# Patient Record
Sex: FEMALE | Race: OTHER | NOT HISPANIC OR LATINO | URBAN - METROPOLITAN AREA
[De-identification: names, ages, dates, MRNs, and addresses within clinical notes are randomized per-mention and may not be internally consistent; named-entity substitution may affect disease eponyms.]

---

## 2023-09-26 ENCOUNTER — INPATIENT (INPATIENT)
Facility: HOSPITAL | Age: 68
LOS: 8 days | Discharge: HOME HEALTH | DRG: 314 | End: 2023-10-05
Attending: HOSPITALIST | Admitting: STUDENT IN AN ORGANIZED HEALTH CARE EDUCATION/TRAINING PROGRAM
Payer: MEDICARE

## 2023-09-26 VITALS
HEIGHT: 67 IN | WEIGHT: 179.9 LBS | SYSTOLIC BLOOD PRESSURE: 156 MMHG | HEART RATE: 56 BPM | DIASTOLIC BLOOD PRESSURE: 71 MMHG | TEMPERATURE: 97 F | RESPIRATION RATE: 22 BRPM | OXYGEN SATURATION: 97 %

## 2023-09-26 LAB
ALBUMIN SERPL ELPH-MCNC: 3.8 G/DL — SIGNIFICANT CHANGE UP (ref 3.3–5)
ALP SERPL-CCNC: 65 U/L — SIGNIFICANT CHANGE UP (ref 40–120)
ALT FLD-CCNC: 12 U/L — SIGNIFICANT CHANGE UP (ref 10–45)
ANION GAP SERPL CALC-SCNC: 12 MMOL/L — SIGNIFICANT CHANGE UP (ref 5–17)
APTT BLD: 28.2 SEC — SIGNIFICANT CHANGE UP (ref 24.5–35.6)
AST SERPL-CCNC: 10 U/L — SIGNIFICANT CHANGE UP (ref 10–40)
BASOPHILS # BLD AUTO: 0.03 K/UL — SIGNIFICANT CHANGE UP (ref 0–0.2)
BASOPHILS NFR BLD AUTO: 0.3 % — SIGNIFICANT CHANGE UP (ref 0–2)
BILIRUB SERPL-MCNC: 0.8 MG/DL — SIGNIFICANT CHANGE UP (ref 0.2–1.2)
BLD GP AB SCN SERPL QL: NEGATIVE — SIGNIFICANT CHANGE UP
BUN SERPL-MCNC: 33 MG/DL — HIGH (ref 7–23)
CALCIUM SERPL-MCNC: 9.6 MG/DL — SIGNIFICANT CHANGE UP (ref 8.4–10.5)
CHLORIDE SERPL-SCNC: 110 MMOL/L — HIGH (ref 96–108)
CO2 SERPL-SCNC: 19 MMOL/L — LOW (ref 22–31)
CREAT SERPL-MCNC: 1.72 MG/DL — HIGH (ref 0.5–1.3)
EGFR: 32 ML/MIN/1.73M2 — LOW
EOSINOPHIL # BLD AUTO: 0.18 K/UL — SIGNIFICANT CHANGE UP (ref 0–0.5)
EOSINOPHIL NFR BLD AUTO: 2.1 % — SIGNIFICANT CHANGE UP (ref 0–6)
GLUCOSE SERPL-MCNC: 208 MG/DL — HIGH (ref 70–99)
HCT VFR BLD CALC: 31.6 % — LOW (ref 34.5–45)
HGB BLD-MCNC: 10.1 G/DL — LOW (ref 11.5–15.5)
IMM GRANULOCYTES NFR BLD AUTO: 0.3 % — SIGNIFICANT CHANGE UP (ref 0–0.9)
INR BLD: 1.06 RATIO — SIGNIFICANT CHANGE UP (ref 0.85–1.18)
LACTATE BLDV-MCNC: 1.5 MMOL/L — SIGNIFICANT CHANGE UP (ref 0.5–2)
LYMPHOCYTES # BLD AUTO: 1.85 K/UL — SIGNIFICANT CHANGE UP (ref 1–3.3)
LYMPHOCYTES # BLD AUTO: 21.5 % — SIGNIFICANT CHANGE UP (ref 13–44)
MCHC RBC-ENTMCNC: 26.9 PG — LOW (ref 27–34)
MCHC RBC-ENTMCNC: 32 GM/DL — SIGNIFICANT CHANGE UP (ref 32–36)
MCV RBC AUTO: 84.3 FL — SIGNIFICANT CHANGE UP (ref 80–100)
MONOCYTES # BLD AUTO: 0.46 K/UL — SIGNIFICANT CHANGE UP (ref 0–0.9)
MONOCYTES NFR BLD AUTO: 5.4 % — SIGNIFICANT CHANGE UP (ref 2–14)
NEUTROPHILS # BLD AUTO: 6.04 K/UL — SIGNIFICANT CHANGE UP (ref 1.8–7.4)
NEUTROPHILS NFR BLD AUTO: 70.4 % — SIGNIFICANT CHANGE UP (ref 43–77)
NRBC # BLD: 0 /100 WBCS — SIGNIFICANT CHANGE UP (ref 0–0)
NT-PROBNP SERPL-SCNC: 8521 PG/ML — HIGH (ref 0–300)
PLATELET # BLD AUTO: 193 K/UL — SIGNIFICANT CHANGE UP (ref 150–400)
POTASSIUM SERPL-MCNC: 4.5 MMOL/L — SIGNIFICANT CHANGE UP (ref 3.5–5.3)
POTASSIUM SERPL-SCNC: 4.5 MMOL/L — SIGNIFICANT CHANGE UP (ref 3.5–5.3)
PROT SERPL-MCNC: 7.3 G/DL — SIGNIFICANT CHANGE UP (ref 6–8.3)
PROTHROM AB SERPL-ACNC: 11.1 SEC — SIGNIFICANT CHANGE UP (ref 9.5–13)
RAPID RVP RESULT: SIGNIFICANT CHANGE UP
RBC # BLD: 3.75 M/UL — LOW (ref 3.8–5.2)
RBC # FLD: 14.2 % — SIGNIFICANT CHANGE UP (ref 10.3–14.5)
RH IG SCN BLD-IMP: POSITIVE — SIGNIFICANT CHANGE UP
SARS-COV-2 RNA SPEC QL NAA+PROBE: SIGNIFICANT CHANGE UP
SODIUM SERPL-SCNC: 141 MMOL/L — SIGNIFICANT CHANGE UP (ref 135–145)
TROPONIN T, HIGH SENSITIVITY RESULT: 28 NG/L — SIGNIFICANT CHANGE UP (ref 0–51)
WBC # BLD: 8.59 K/UL — SIGNIFICANT CHANGE UP (ref 3.8–10.5)
WBC # FLD AUTO: 8.59 K/UL — SIGNIFICANT CHANGE UP (ref 3.8–10.5)

## 2023-09-26 PROCEDURE — 71045 X-RAY EXAM CHEST 1 VIEW: CPT | Mod: 26

## 2023-09-26 PROCEDURE — 99285 EMERGENCY DEPT VISIT HI MDM: CPT

## 2023-09-26 PROCEDURE — 99284 EMERGENCY DEPT VISIT MOD MDM: CPT

## 2023-09-26 RX ORDER — FUROSEMIDE 40 MG
40 TABLET ORAL ONCE
Refills: 0 | Status: COMPLETED | OUTPATIENT
Start: 2023-09-26 | End: 2023-09-26

## 2023-09-26 RX ADMIN — Medication 40 MILLIGRAM(S): at 23:52

## 2023-09-26 NOTE — ED PROVIDER NOTE - CLINICAL SUMMARY MEDICAL DECISION MAKING FREE TEXT BOX
Patient is a 67-year-old female past medical history significant for diabetes, hyperlipidemia presenting for shortness of breath and fatigue. Currently with vital signs notably tachypneic, borderline bradycardic, not hypoxic on room air, normotensive.  Presenting for shortness of breath and fatigue in the setting of a recent hospitalization.  Patient states that she was found to have anemia without any other abnormalities noted.  Without any active chest pain or active bleeding.  Differential includes but not limited to anemia versus less likely pneumonia versus viral syndrome versus pneumothorax versus other structural lung disease.  Will eval with labs, chest x-ray, occult stool for blood.  Because she cannot walk or take care of herself at home is a likely admit

## 2023-09-26 NOTE — ED PROVIDER NOTE - PROGRESS NOTE DETAILS
Rakesh Munson MD PGY2: CXR with infiltrates, bnp elevated. Possibly chf. To admit. Discussed with pt, amenable. Lasix given.

## 2023-09-26 NOTE — ED ADULT NURSE NOTE - NSFALLRISKINTERV_ED_ALL_ED

## 2023-09-26 NOTE — ED PROCEDURE NOTE - PROCEDURE ADDITIONAL DETAILS
Peripheral IV access in the Emergency Department obtained under dynamic ultrasound guidance with dark nonpulsatile blood return.  Catheter was flushed afterwards without any resistance or resulting extravasation.  IV catheter confirmed in compressible vein after insertion. 20 gauge catheter placed in a peripheral vein in the L upper extremity.

## 2023-09-26 NOTE — ED PROVIDER NOTE - PHYSICAL EXAMINATION
CONSTITUTIONAL: Well-developed; well-nourished; appears uncomfortable  SKIN: warm, dry  HEAD: Normocephalic; atraumatic.  EYES: no conjunctival injection. PERRL. no scleral icterus  ENT: No nasal discharge; airway clear.  NECK: Supple; non tender.  CARD: S1, S2 normal; no murmurs, gallops, or rubs. Regular rate and rhythm.   RESP: No wheezes, rales or rhonchi. tachypneic. satting well on room air. no accessory muscle use.  ABD: soft ntnd, no guarding, no distention, no rigidity.   EXT:  No cyanosis or edema.   NEURO: Alert, oriented, grossly unremarkable, moves all extremities equally  PSYCH: Cooperative, appropriate.

## 2023-09-26 NOTE — ED PROVIDER NOTE - OBJECTIVE STATEMENT
Patient is a 67-year-old female past medical history significant for diabetes, hyperlipidemia presenting for shortness of breath and fatigue.  Patient states that the symptoms occurred prior to her recent admission 3 days ago to [facility], that she presented there with the same symptoms and was told that she has anemia with her hemoglobin in the 7 range.  Says that they did not tell her that there was any other remarkable findings on her labs, that she had an echo there that she believes was unremarkable.  Says that she received no treatment, was sent home without any improvement in her symptoms.  Says that while at home she still been experiencing generalized fatigue and difficulty getting around her house by herself, lives alone without any help, unable to walk now because of her weakness.  Additionally still short of breath.  Called her primary care doctor told her to come to the emergency room.  Denies any bloody stools, hematuria, bloody emesis, vaginal bleeding.  No recent injuries.  No chest pain, back pain, abdominal pain, dysuria.  No fevers, chills, sore throat, cough, sick contacts, recent travel.  Says that in the past she has required a transfusion for anemia though does not know why she was anemic at that time, this was over 30 years ago.

## 2023-09-26 NOTE — ED ADULT NURSE NOTE - OBJECTIVE STATEMENT
67y F PMH DM, HTN, HLD bibEMS c/o generalized weakness, sob. Pt appears to be moderately anxious. O2 100% RA. Pt was recently admitted to [facility] x3days and was told she was anemic with hgb in the 7's. Pt was d/c but reports she is still having symptoms of generalized weakness and diff getting around at home. Pt lives alone. Denies cp, nvd, dysuria, headache, fevers, chills. VS documented. Comfort and safety maintained.

## 2023-09-26 NOTE — ED PROVIDER NOTE - ATTENDING CONTRIBUTION TO CARE
Attending MD JORDYN Sumner I performed a history and physical exam of the patient and discussed their management with the resident. I reviewed the resident's note and agree with the documented findings and plan of care, except as noted. My medical decision making and observations are as follows:    77 F with PMH HLD, DM presenting with several days of shortness of breath, dyspnea on exertion, fatigue.  Recently admitted to [facility] where she was told she was anemic to hemoglobin 7 and underwent other work-up but reportedly received no treatment and no improvement in symptoms.  Symptoms have been worsening at home, where she lives alone without help.  Goes to bed to walk.  Primary doctor referred patient to [facility] ED.  No fever, chest pain, cough, abdominal pain, GI symptoms, black or bloody stools, vaginal bleeding or discharge.  On exam, patient very anxious appearing and tachypneic but normal work of breathing and speaking in full sentences.  Heart lungs clear to auscultation, abdomen soft nontender, no conjunctival pallor.  No pedal edema.      MDM–elderly female with HLD, DM presenting for evaluation of dyspnea on exertion and fatigue, unclear work-up at outside hospital during recent admission.  Symptoms may reflect ACS versus CHF versus anemia versus metabolic derangement, will assess with labs and x-ray.  No leukocytosis, mild anemia to 10.1, no thrombocytopenia.     2300–signed out to Dr. Meier pending remaining labs, x-ray, reassessment and final disposition.

## 2023-09-27 DIAGNOSIS — R06.02 SHORTNESS OF BREATH: ICD-10-CM

## 2023-09-27 DIAGNOSIS — N17.9 ACUTE KIDNEY FAILURE, UNSPECIFIED: ICD-10-CM

## 2023-09-27 DIAGNOSIS — R09.89 OTHER SPECIFIED SYMPTOMS AND SIGNS INVOLVING THE CIRCULATORY AND RESPIRATORY SYSTEMS: ICD-10-CM

## 2023-09-27 DIAGNOSIS — I95.9 HYPOTENSION, UNSPECIFIED: ICD-10-CM

## 2023-09-27 DIAGNOSIS — Z90.49 ACQUIRED ABSENCE OF OTHER SPECIFIED PARTS OF DIGESTIVE TRACT: Chronic | ICD-10-CM

## 2023-09-27 DIAGNOSIS — R06.00 DYSPNEA, UNSPECIFIED: ICD-10-CM

## 2023-09-27 DIAGNOSIS — E11.9 TYPE 2 DIABETES MELLITUS WITHOUT COMPLICATIONS: ICD-10-CM

## 2023-09-27 LAB
ALBUMIN SERPL ELPH-MCNC: 3.7 G/DL — SIGNIFICANT CHANGE UP (ref 3.3–5)
ALP SERPL-CCNC: 61 U/L — SIGNIFICANT CHANGE UP (ref 40–120)
ALT FLD-CCNC: 13 U/L — SIGNIFICANT CHANGE UP (ref 10–45)
ANION GAP SERPL CALC-SCNC: 17 MMOL/L — SIGNIFICANT CHANGE UP (ref 5–17)
AST SERPL-CCNC: 13 U/L — SIGNIFICANT CHANGE UP (ref 10–40)
BASOPHILS # BLD AUTO: 0.04 K/UL — SIGNIFICANT CHANGE UP (ref 0–0.2)
BASOPHILS NFR BLD AUTO: 0.5 % — SIGNIFICANT CHANGE UP (ref 0–2)
BILIRUB SERPL-MCNC: 0.9 MG/DL — SIGNIFICANT CHANGE UP (ref 0.2–1.2)
BUN SERPL-MCNC: 34 MG/DL — HIGH (ref 7–23)
CALCIUM SERPL-MCNC: 9.3 MG/DL — SIGNIFICANT CHANGE UP (ref 8.4–10.5)
CHLORIDE SERPL-SCNC: 108 MMOL/L — SIGNIFICANT CHANGE UP (ref 96–108)
CO2 SERPL-SCNC: 17 MMOL/L — LOW (ref 22–31)
CREAT SERPL-MCNC: 1.75 MG/DL — HIGH (ref 0.5–1.3)
D DIMER BLD IA.RAPID-MCNC: 214 NG/ML DDU — SIGNIFICANT CHANGE UP
EGFR: 32 ML/MIN/1.73M2 — LOW
EOSINOPHIL # BLD AUTO: 0.23 K/UL — SIGNIFICANT CHANGE UP (ref 0–0.5)
EOSINOPHIL NFR BLD AUTO: 3 % — SIGNIFICANT CHANGE UP (ref 0–6)
ERYTHROCYTE [SEDIMENTATION RATE] IN BLOOD: 74 MM/HR — HIGH (ref 0–20)
GLUCOSE BLDC GLUCOMTR-MCNC: 173 MG/DL — HIGH (ref 70–99)
GLUCOSE BLDC GLUCOMTR-MCNC: 183 MG/DL — HIGH (ref 70–99)
GLUCOSE BLDC GLUCOMTR-MCNC: 206 MG/DL — HIGH (ref 70–99)
GLUCOSE SERPL-MCNC: 214 MG/DL — HIGH (ref 70–99)
HCT VFR BLD CALC: 33.3 % — LOW (ref 34.5–45)
HGB BLD-MCNC: 10.4 G/DL — LOW (ref 11.5–15.5)
IMM GRANULOCYTES NFR BLD AUTO: 0.3 % — SIGNIFICANT CHANGE UP (ref 0–0.9)
LDH SERPL L TO P-CCNC: 199 U/L — SIGNIFICANT CHANGE UP (ref 50–242)
LYMPHOCYTES # BLD AUTO: 1.9 K/UL — SIGNIFICANT CHANGE UP (ref 1–3.3)
LYMPHOCYTES # BLD AUTO: 24.9 % — SIGNIFICANT CHANGE UP (ref 13–44)
MAGNESIUM SERPL-MCNC: 1.7 MG/DL — SIGNIFICANT CHANGE UP (ref 1.6–2.6)
MCHC RBC-ENTMCNC: 26.5 PG — LOW (ref 27–34)
MCHC RBC-ENTMCNC: 31.2 GM/DL — LOW (ref 32–36)
MCV RBC AUTO: 84.7 FL — SIGNIFICANT CHANGE UP (ref 80–100)
MONOCYTES # BLD AUTO: 0.76 K/UL — SIGNIFICANT CHANGE UP (ref 0–0.9)
MONOCYTES NFR BLD AUTO: 10 % — SIGNIFICANT CHANGE UP (ref 2–14)
NEUTROPHILS # BLD AUTO: 4.67 K/UL — SIGNIFICANT CHANGE UP (ref 1.8–7.4)
NEUTROPHILS NFR BLD AUTO: 61.3 % — SIGNIFICANT CHANGE UP (ref 43–77)
NRBC # BLD: 0 /100 WBCS — SIGNIFICANT CHANGE UP (ref 0–0)
PHOSPHATE SERPL-MCNC: 3.5 MG/DL — SIGNIFICANT CHANGE UP (ref 2.5–4.5)
PLATELET # BLD AUTO: 174 K/UL — SIGNIFICANT CHANGE UP (ref 150–400)
POTASSIUM SERPL-MCNC: 4.5 MMOL/L — SIGNIFICANT CHANGE UP (ref 3.5–5.3)
POTASSIUM SERPL-SCNC: 4.5 MMOL/L — SIGNIFICANT CHANGE UP (ref 3.5–5.3)
PROCALCITONIN SERPL-MCNC: 0.09 NG/ML — SIGNIFICANT CHANGE UP (ref 0.02–0.1)
PROT SERPL-MCNC: 7.1 G/DL — SIGNIFICANT CHANGE UP (ref 6–8.3)
RBC # BLD: 3.93 M/UL — SIGNIFICANT CHANGE UP (ref 3.8–5.2)
RBC # FLD: 14 % — SIGNIFICANT CHANGE UP (ref 10.3–14.5)
SARS-COV-2 RNA SPEC QL NAA+PROBE: SIGNIFICANT CHANGE UP
SODIUM SERPL-SCNC: 142 MMOL/L — SIGNIFICANT CHANGE UP (ref 135–145)
TROPONIN T, HIGH SENSITIVITY RESULT: 28 NG/L — SIGNIFICANT CHANGE UP (ref 0–51)
WBC # BLD: 7.62 K/UL — SIGNIFICANT CHANGE UP (ref 3.8–10.5)
WBC # FLD AUTO: 7.62 K/UL — SIGNIFICANT CHANGE UP (ref 3.8–10.5)

## 2023-09-27 PROCEDURE — 99223 1ST HOSP IP/OBS HIGH 75: CPT

## 2023-09-27 PROCEDURE — 78582 LUNG VENTILAT&PERFUS IMAGING: CPT | Mod: 26

## 2023-09-27 RX ORDER — METFORMIN HYDROCHLORIDE 850 MG/1
1 TABLET ORAL
Refills: 0 | DISCHARGE

## 2023-09-27 RX ORDER — DEXTROSE 50 % IN WATER 50 %
15 SYRINGE (ML) INTRAVENOUS ONCE
Refills: 0 | Status: DISCONTINUED | OUTPATIENT
Start: 2023-09-27 | End: 2023-10-05

## 2023-09-27 RX ORDER — SODIUM CHLORIDE 9 MG/ML
500 INJECTION, SOLUTION INTRAVENOUS
Refills: 0 | Status: DISCONTINUED | OUTPATIENT
Start: 2023-09-27 | End: 2023-10-02

## 2023-09-27 RX ORDER — HEPARIN SODIUM 5000 [USP'U]/ML
5000 INJECTION INTRAVENOUS; SUBCUTANEOUS THREE TIMES A DAY
Refills: 0 | Status: DISCONTINUED | OUTPATIENT
Start: 2023-09-27 | End: 2023-10-05

## 2023-09-27 RX ORDER — INSULIN LISPRO 100/ML
VIAL (ML) SUBCUTANEOUS AT BEDTIME
Refills: 0 | Status: DISCONTINUED | OUTPATIENT
Start: 2023-09-27 | End: 2023-10-05

## 2023-09-27 RX ORDER — GLIMEPIRIDE 1 MG
1 TABLET ORAL
Refills: 0 | DISCHARGE

## 2023-09-27 RX ORDER — SODIUM CHLORIDE 9 MG/ML
1000 INJECTION, SOLUTION INTRAVENOUS
Refills: 0 | Status: DISCONTINUED | OUTPATIENT
Start: 2023-09-27 | End: 2023-10-05

## 2023-09-27 RX ORDER — INSULIN LISPRO 100/ML
VIAL (ML) SUBCUTANEOUS
Refills: 0 | Status: DISCONTINUED | OUTPATIENT
Start: 2023-09-27 | End: 2023-10-05

## 2023-09-27 RX ORDER — DEXTROSE 50 % IN WATER 50 %
12.5 SYRINGE (ML) INTRAVENOUS ONCE
Refills: 0 | Status: DISCONTINUED | OUTPATIENT
Start: 2023-09-27 | End: 2023-10-05

## 2023-09-27 RX ORDER — DEXTROSE 50 % IN WATER 50 %
25 SYRINGE (ML) INTRAVENOUS ONCE
Refills: 0 | Status: DISCONTINUED | OUTPATIENT
Start: 2023-09-27 | End: 2023-10-05

## 2023-09-27 RX ORDER — INFLUENZA VIRUS VACCINE 15; 15; 15; 15 UG/.5ML; UG/.5ML; UG/.5ML; UG/.5ML
0.7 SUSPENSION INTRAMUSCULAR ONCE
Refills: 0 | Status: DISCONTINUED | OUTPATIENT
Start: 2023-09-27 | End: 2023-10-05

## 2023-09-27 RX ORDER — GLUCAGON INJECTION, SOLUTION 0.5 MG/.1ML
1 INJECTION, SOLUTION SUBCUTANEOUS ONCE
Refills: 0 | Status: DISCONTINUED | OUTPATIENT
Start: 2023-09-27 | End: 2023-10-05

## 2023-09-27 RX ADMIN — HEPARIN SODIUM 5000 UNIT(S): 5000 INJECTION INTRAVENOUS; SUBCUTANEOUS at 06:10

## 2023-09-27 RX ADMIN — Medication 1: at 18:00

## 2023-09-27 RX ADMIN — HEPARIN SODIUM 5000 UNIT(S): 5000 INJECTION INTRAVENOUS; SUBCUTANEOUS at 14:30

## 2023-09-27 RX ADMIN — SODIUM CHLORIDE 100 MILLILITER(S): 9 INJECTION, SOLUTION INTRAVENOUS at 14:33

## 2023-09-27 RX ADMIN — HEPARIN SODIUM 5000 UNIT(S): 5000 INJECTION INTRAVENOUS; SUBCUTANEOUS at 21:25

## 2023-09-27 RX ADMIN — Medication 1: at 13:14

## 2023-09-27 RX ADMIN — Medication 2: at 08:05

## 2023-09-27 NOTE — PHYSICAL THERAPY INITIAL EVALUATION ADULT - ADDITIONAL COMMENTS
Pt reports living at home in a 1st floor apartment, vague who lives upstairs (Friends?), no stairs reported; PTA amb ind no DME and ind ADLs, +wears glasses, +RH, +Drives, +Retired, +walkin shower no DME

## 2023-09-27 NOTE — PHYSICAL THERAPY INITIAL EVALUATION ADULT - TRANSFER SAFETY CONCERNS NOTED: SIT/STAND, REHAB EVAL
Epilation of Eyelashes today at slit lamp. decreased balance during turns/decreased step length/decreased weight-shifting ability

## 2023-09-27 NOTE — PHYSICAL THERAPY INITIAL EVALUATION ADULT - NSPTDMEREC_GEN_A_CORE
recommend rolling walker due to decr endurance and improve overall functional endurance; +recommend shower chair for decreased endurance

## 2023-09-27 NOTE — H&P ADULT - ASSESSMENT
67F c hx HTN, DM2, recent hospitalization @ [facility] (9/24-9/25/23?) for sob and weakness, pw sob, lightheadedness, weakness, hypotension of unclear etiol

## 2023-09-27 NOTE — PHYSICAL THERAPY INITIAL EVALUATION ADULT - ACTIVE RANGE OF MOTION EXAMINATION, REHAB EVAL
decr b/l shld flexion/Left LE Active ROM was WFL (within functional limits)/Right LE Active ROM was WFL (within functional limits)

## 2023-09-27 NOTE — PHYSICAL THERAPY INITIAL EVALUATION ADULT - GENERAL OBSERVATIONS, REHAB EVAL
Pt a/w SOB, lightheadedness, weakness, hypotension of unclear etiol; pending VQ scan (routine, r/o PE), orders requesting orthostatics. Pt received sitting EOB on stretcher (orang 61L), +IVL, A&OX4.

## 2023-09-27 NOTE — H&P ADULT - NSHPSOCIALHISTORY_GEN_ALL_CORE
Social History:    Marital Status: (  ) , (  ) Single, (  ) , (  ) , (  )   # of Children: 2  Lives with: ( x ) alone, (  ) children, (  ) spouse, (  ) parents, (  ) siblings, (  ) friends, (  ) other:   Occupation:     Substance Use/Illicit Drugs: (  ) never used vs other:   Tobacco Usage: (x  ) never smoked, (  ) former smoker, (  ) current smoker and Total Pack-Years:   Last Alcohol Usage/Frequency/Amount/Withdrawal/Hx of Abuse:  none  Foreign travel:   Animal exposure:

## 2023-09-27 NOTE — H&P ADULT - HISTORY OF PRESENT ILLNESS
67F c hx HTN, DM2, recent hospitalization @ [facility] (9/24-9/25/23?) for sob and weakness, pw sob, lightheadedness, weakness, hypotension.    Pt states she was in usual state of health until 10 days ago, when her symptoms started. Pt states that on stand and walking, pt gets whole body weakness, lightheaded, palpitations. Pt states she checked her blood pressure and it was SBP ~88. Pt also reports SOB on exertion and on lying flat. Pt presented to OSH where she was found to have Cr of 1.7-1.8, hb of ~10, TTE grossly normal with EF 76%, renal ultrasound without hydronephrosis. All these results from OSH are on pt's phone. On discharge from OSH, pt was told to stop her losartan and metoprolol. Pt states her symptoms did not get any better, which brings her into [facility].  Pt reports flying back from [facility] in Jun '23, and while in [facility] she had leg swelling, but the swelling has since resolved. Pt denies fevers, CP, diarrhea, URI symptoms. Pt also denies focal arm/leg weakness. Pt reports her last stress test was 1 year ago, but it was terminated early as she was not feeling well from having covid at that time. unknown result of stress test.

## 2023-09-27 NOTE — PHYSICAL THERAPY INITIAL EVALUATION ADULT - NSPTDISCHREC_GEN_A_CORE
DC subacute rehab; if pt to go home, home PT services, assist from family/friends as able, recommend rolling walker due to decr endurance and improve overall functional endurance; +recommend shower chair for decreased endurance, CM to be notified./Sub-acute Rehab

## 2023-09-27 NOTE — PATIENT PROFILE ADULT - FALL HARM RISK - HARM RISK INTERVENTIONS
Assistance with ambulation/Assistance OOB with selected safe patient handling equipment/Communicate Risk of Fall with Harm to all staff/Monitor gait and stability/Reinforce activity limits and safety measures with patient and family/Sit up slowly, dangle for a short time, stand at bedside before walking/Tailored Fall Risk Interventions/Visual Cue: Yellow wristband and red socks/Bed in lowest position, wheels locked, appropriate side rails in place/Call bell, personal items and telephone in reach/Instruct patient to call for assistance before getting out of bed or chair/Non-slip footwear when patient is out of bed/[place] to call system/Physically safe environment - no spills, clutter or unnecessary equipment/Purposeful Proactive Rounding/Room/bathroom lighting operational, light cord in reach

## 2023-09-27 NOTE — H&P ADULT - NSHPPHYSICALEXAM_GEN_ALL_CORE
PHYSICAL EXAM:   GENERAL: Alert. Not confused. No acute distress. Not thin. Not cachectic. +obese.  HEAD:  Atraumatic. Normocephalic.  EYES: EOMI. PERRLA. Normal conjunctiva/sclera.  ENT: Neck supple. No JVD. Moist oral mucosa. Not edentulous. No thrush.  LYMPH: Normal supraclavicular/cervical lymph nodes.   CARDIAC: Not tachy, Not alyssa. Regular rhythm. Not irregularly irregular. S1. S2. +grade 3 systolic murmur. No rub. No distant heart sounds.  LUNG/CHEST: CTAB. BS equal bilaterally. No wheezes. No rales. No rhonchi.  ABDOMEN: Soft. No tenderness. No distension. No fluid wave. Normal bowel sounds.  BACK: No midline/vertebral tenderness. No flank tenderness.  VASCULAR: +2 b/l radial or ulnar pulses. Palpable DP pulses.  EXTREMITIES:  No clubbing. No cyanosis. +1 b/l LE nonpitting edema. Moving all 4.  NEUROLOGY: A&Ox3. Non-focal exam. Cranial nerves intact. Normal speech. Sensation intact.  PSYCH: Normal behavior. Normal affect.  SKIN: No jaundice. No erythema. No rash/lesion.  Vital Signs Last 24 Hrs  T(C): 36.4 (27 Sep 2023 04:10), Max: 36.8 (27 Sep 2023 02:31)  T(F): 97.5 (27 Sep 2023 04:10), Max: 98.3 (27 Sep 2023 02:31)  HR: 55 (27 Sep 2023 04:10) (54 - 60)  BP: 150/65 (27 Sep 2023 04:10) (129/70 - 187/70)  BP(mean): --  ABP: --  ABP(mean): --  RR: 19 (27 Sep 2023 04:10) (19 - 22)  SpO2: 98% (27 Sep 2023 04:10) (97% - 100%)    O2 Parameters below as of 27 Sep 2023 04:10  Patient On (Oxygen Delivery Method): room air          I&O's Summary

## 2023-09-27 NOTE — H&P ADULT - PROBLEM SELECTOR PLAN 1
- check orthostats  - TTE result from 9/25/23 on pt's phone grossly normal with mild vavular dysfxn  - cont to hold antihypertensives

## 2023-09-27 NOTE — H&P ADULT - NSHPLABSRESULTS_GEN_ALL_CORE
Personally reviewed old records.  Personally reviewed labs.  Personally reviewed imaging.  Personally reviewed EKG. Sinus alyssa rate 56. . TWI in L. No ST changes                          10.1   8.59  )-----------( 193      ( 26 Sep 2023 21:45 )             31.6       09-26    141  |  110<H>  |  33<H>  ----------------------------<  208<H>  4.5   |  19<L>  |  1.72<H>    Ca    9.6      26 Sep 2023 21:45    TPro  7.3  /  Alb  3.8  /  TBili  0.8  /  DBili  x   /  AST  10  /  ALT  12  /  AlkPhos  65  09-26            LIVER FUNCTIONS - ( 26 Sep 2023 21:45 )  Alb: 3.8 g/dL / Pro: 7.3 g/dL / ALK PHOS: 65 U/L / ALT: 12 U/L / AST: 10 U/L / GGT: x             PT/INR - ( 26 Sep 2023 21:45 )   PT: 11.1 sec;   INR: 1.06 ratio         PTT - ( 26 Sep 2023 21:45 )  PTT:28.2 sec    Urinalysis Basic - ( 26 Sep 2023 21:45 )    Color: x / Appearance: x / SG: x / pH: x  Gluc: 208 mg/dL / Ketone: x  / Bili: x / Urobili: x   Blood: x / Protein: x / Nitrite: x   Leuk Esterase: x / RBC: x / WBC x   Sq Epi: x / Non Sq Epi: x / Bacteria: x

## 2023-09-28 LAB
ANION GAP SERPL CALC-SCNC: 13 MMOL/L — SIGNIFICANT CHANGE UP (ref 5–17)
ANION GAP SERPL CALC-SCNC: 15 MMOL/L — SIGNIFICANT CHANGE UP (ref 5–17)
APPEARANCE UR: CLEAR — SIGNIFICANT CHANGE UP
B BURGDOR C6 AB SER-ACNC: NEGATIVE — SIGNIFICANT CHANGE UP
B BURGDOR IGG+IGM SER-ACNC: 0.11 INDEX — SIGNIFICANT CHANGE UP (ref 0.01–0.89)
BILIRUB UR-MCNC: NEGATIVE — SIGNIFICANT CHANGE UP
BUN SERPL-MCNC: 44 MG/DL — HIGH (ref 7–23)
BUN SERPL-MCNC: 46 MG/DL — HIGH (ref 7–23)
CALCIUM SERPL-MCNC: 9.3 MG/DL — SIGNIFICANT CHANGE UP (ref 8.4–10.5)
CALCIUM SERPL-MCNC: 9.6 MG/DL — SIGNIFICANT CHANGE UP (ref 8.4–10.5)
CHLORIDE SERPL-SCNC: 106 MMOL/L — SIGNIFICANT CHANGE UP (ref 96–108)
CHLORIDE SERPL-SCNC: 108 MMOL/L — SIGNIFICANT CHANGE UP (ref 96–108)
CHLORIDE UR-SCNC: 37 MMOL/L — SIGNIFICANT CHANGE UP
CO2 SERPL-SCNC: 18 MMOL/L — LOW (ref 22–31)
CO2 SERPL-SCNC: 20 MMOL/L — LOW (ref 22–31)
COLOR SPEC: SIGNIFICANT CHANGE UP
CORTIS AM PEAK SERPL-MCNC: 9.9 UG/DL — SIGNIFICANT CHANGE UP (ref 6–18.4)
CREAT ?TM UR-MCNC: 110 MG/DL — SIGNIFICANT CHANGE UP
CREAT SERPL-MCNC: 2.01 MG/DL — HIGH (ref 0.5–1.3)
CREAT SERPL-MCNC: 2.01 MG/DL — HIGH (ref 0.5–1.3)
DIFF PNL FLD: NEGATIVE — SIGNIFICANT CHANGE UP
EGFR: 27 ML/MIN/1.73M2 — LOW
EGFR: 27 ML/MIN/1.73M2 — LOW
GLUCOSE BLDC GLUCOMTR-MCNC: 188 MG/DL — HIGH (ref 70–99)
GLUCOSE BLDC GLUCOMTR-MCNC: 201 MG/DL — HIGH (ref 70–99)
GLUCOSE BLDC GLUCOMTR-MCNC: 231 MG/DL — HIGH (ref 70–99)
GLUCOSE BLDC GLUCOMTR-MCNC: 333 MG/DL — HIGH (ref 70–99)
GLUCOSE SERPL-MCNC: 259 MG/DL — HIGH (ref 70–99)
GLUCOSE SERPL-MCNC: 364 MG/DL — HIGH (ref 70–99)
GLUCOSE UR QL: ABNORMAL
HCT VFR BLD CALC: 31.9 % — LOW (ref 34.5–45)
HCV AB S/CO SERPL IA: 0.1 S/CO — SIGNIFICANT CHANGE UP (ref 0–0.99)
HCV AB SERPL-IMP: SIGNIFICANT CHANGE UP
HGB BLD-MCNC: 10.2 G/DL — LOW (ref 11.5–15.5)
KETONES UR-MCNC: NEGATIVE — SIGNIFICANT CHANGE UP
LACTATE SERPL-SCNC: 1.3 MMOL/L — SIGNIFICANT CHANGE UP (ref 0.5–2)
LEUKOCYTE ESTERASE UR-ACNC: NEGATIVE — SIGNIFICANT CHANGE UP
MCHC RBC-ENTMCNC: 27 PG — SIGNIFICANT CHANGE UP (ref 27–34)
MCHC RBC-ENTMCNC: 32 GM/DL — SIGNIFICANT CHANGE UP (ref 32–36)
MCV RBC AUTO: 84.4 FL — SIGNIFICANT CHANGE UP (ref 80–100)
NITRITE UR-MCNC: NEGATIVE — SIGNIFICANT CHANGE UP
NRBC # BLD: 0 /100 WBCS — SIGNIFICANT CHANGE UP (ref 0–0)
NT-PROBNP SERPL-SCNC: 5196 PG/ML — HIGH (ref 0–300)
OB PNL STL: NEGATIVE — SIGNIFICANT CHANGE UP
PH UR: 5.5 — SIGNIFICANT CHANGE UP (ref 5–8)
PLATELET # BLD AUTO: 180 K/UL — SIGNIFICANT CHANGE UP (ref 150–400)
POTASSIUM SERPL-MCNC: 4.2 MMOL/L — SIGNIFICANT CHANGE UP (ref 3.5–5.3)
POTASSIUM SERPL-MCNC: 4.4 MMOL/L — SIGNIFICANT CHANGE UP (ref 3.5–5.3)
POTASSIUM SERPL-SCNC: 4.2 MMOL/L — SIGNIFICANT CHANGE UP (ref 3.5–5.3)
POTASSIUM SERPL-SCNC: 4.4 MMOL/L — SIGNIFICANT CHANGE UP (ref 3.5–5.3)
POTASSIUM UR-SCNC: 27 MMOL/L — SIGNIFICANT CHANGE UP
PROT ?TM UR-MCNC: 14 MG/DL — HIGH (ref 0–12)
PROT UR-MCNC: SIGNIFICANT CHANGE UP
PROT/CREAT UR-RTO: 0.1 RATIO — SIGNIFICANT CHANGE UP (ref 0–0.2)
RBC # BLD: 3.78 M/UL — LOW (ref 3.8–5.2)
RBC # FLD: 14 % — SIGNIFICANT CHANGE UP (ref 10.3–14.5)
SODIUM SERPL-SCNC: 139 MMOL/L — SIGNIFICANT CHANGE UP (ref 135–145)
SODIUM SERPL-SCNC: 141 MMOL/L — SIGNIFICANT CHANGE UP (ref 135–145)
SODIUM UR-SCNC: 55 MMOL/L — SIGNIFICANT CHANGE UP
SP GR SPEC: 1.02 — SIGNIFICANT CHANGE UP (ref 1.01–1.02)
TSH SERPL-MCNC: 3.17 UIU/ML — SIGNIFICANT CHANGE UP (ref 0.27–4.2)
UROBILINOGEN FLD QL: NEGATIVE — SIGNIFICANT CHANGE UP
WBC # BLD: 6.38 K/UL — SIGNIFICANT CHANGE UP (ref 3.8–10.5)
WBC # FLD AUTO: 6.38 K/UL — SIGNIFICANT CHANGE UP (ref 3.8–10.5)

## 2023-09-28 PROCEDURE — 99233 SBSQ HOSP IP/OBS HIGH 50: CPT

## 2023-09-28 PROCEDURE — 93010 ELECTROCARDIOGRAM REPORT: CPT

## 2023-09-28 PROCEDURE — 76770 US EXAM ABDO BACK WALL COMP: CPT | Mod: 26

## 2023-09-28 PROCEDURE — 99222 1ST HOSP IP/OBS MODERATE 55: CPT | Mod: GC

## 2023-09-28 RX ORDER — CHLORHEXIDINE GLUCONATE 213 G/1000ML
1 SOLUTION TOPICAL
Refills: 0 | Status: DISCONTINUED | OUTPATIENT
Start: 2023-09-28 | End: 2023-10-05

## 2023-09-28 RX ORDER — FUROSEMIDE 40 MG
40 TABLET ORAL ONCE
Refills: 0 | Status: COMPLETED | OUTPATIENT
Start: 2023-09-28 | End: 2023-09-28

## 2023-09-28 RX ADMIN — Medication 2: at 12:23

## 2023-09-28 RX ADMIN — Medication 2: at 07:55

## 2023-09-28 RX ADMIN — HEPARIN SODIUM 5000 UNIT(S): 5000 INJECTION INTRAVENOUS; SUBCUTANEOUS at 05:38

## 2023-09-28 RX ADMIN — Medication 40 MILLIGRAM(S): at 23:07

## 2023-09-28 RX ADMIN — Medication 4: at 17:33

## 2023-09-28 RX ADMIN — HEPARIN SODIUM 5000 UNIT(S): 5000 INJECTION INTRAVENOUS; SUBCUTANEOUS at 21:23

## 2023-09-28 RX ADMIN — CHLORHEXIDINE GLUCONATE 1 APPLICATION(S): 213 SOLUTION TOPICAL at 17:34

## 2023-09-28 NOTE — CONSULT NOTE ADULT - ATTENDING COMMENTS
Follow up echocardiogram   Nuclear stress test for assessment for ischemia- intermediate risk of CAD. Not a candidate for CTCA due to elevated creatinine.   Monitor on tele - asymptomatic 2:1 block noted on tele
a 67-year-old female with medical hx of HTN, NIDDM-Type 2, and HLD who presented  for weakness, hypotension, CAGE, and orthopnea.   Upon presentation to [facility] on 9/26, patient was hypertensive 156/71 and tachypneic RR 22 with SpO2 97% on room air. Labs showed a mild hyperchloremic non-anion gap metabolic acidosis with normal lactate and Cr 1.72. BNP elevated >8k. Trops and procal wnl. ESR 74. D-dimer and V/Q scan low probability for PE. COVID/RVP negative. CXR normal. UA bland except for significant glucosuria. Renal US normal. Received Lasix 40mg IV on 9/26 and 500cc LR on 5/27. Cr trending up. Nephrology service consulted for LOPEZ. Patient denies kidney disease, unclear baseline Cr. Takes Losartan, Metop, Metformin, Glipizide, and Pravastatin. Denies being on diuretic or SGLT-2 inhibitor.  Alert, conversant, modest respiratory distress  1.  Renal failure--likely ARF on CKD with DM nephropathy.  Potential cardiorenal component being addressed and diuresis may --> plateau Cr or improving.  w/u as outlined.  After improve NO sulfonylurea --> low dose metformin, SDLT2i, possible GLP1 and restore ARB (screen doppler renal for RICHARD  2.  Volume overload--diuresis, high BNP, w/u in progress  3.  Hypertension--goal systolic <130.  NO RAASi in near term as needed    discussed with med team  Richardson Oconnor MD  contact me on TEAMS

## 2023-09-28 NOTE — CONSULT NOTE ADULT - ASSESSMENT
66 yo F with PMHx HTN, T2DM admitted for episodes of dyspnea and weakness on exertion. Cardiology consulted for 2:1 AV block, in Mobitz 1 as seen by occasional 3:2 groups seen on telemetry. EKGs at her last hospitalization also noted Mobitz 1 heart block. The etiology of her weakness episodes is unclear, and unlikely to be secondary to the 2:1 AV block as her HRs have remained in 50s and 60s and her symptoms are profound with even minimal activity. It is reasonable to consider an ischemic evaluation in this patient with risk factors and symptoms on exertion.    Recommendations:  - Please obtain new TTE to evaluate murmur on exam and patient's symptoms  - Continue to monitor on telemetry and note HR with patient's symptoms with activity  - Hold all AV karen blocking agents such as beta blockers  - If patient becomes hemodynamically unstable, give atropine, place external pacing pads on the patient, and call on-call cardiology fellow  - Send Lyme titers  - Check TSH    ***Note not finalized until co-signed by attending***     Nasim Rosales MD  Cardiology Fellow  All Cardiology service information can be found 24/7 on [email or web address], password: [email or web address]

## 2023-09-28 NOTE — PROGRESS NOTE ADULT - SUBJECTIVE AND OBJECTIVE BOX
[facility] Division of Hospital Medicine  Jelena Bryant MD  MS Teams PREFERRED        SUBJECTIVE / OVERNIGHT EVENTS: Seen and examined at bedside. She feels tired and notes that she's had episodes of hypotension since was received the covid vaccine. She ate a lot of olives to try to increase her BP when at home.    MEDICATIONS  (STANDING):  chlorhexidine 2% Cloths 1 Application(s) Topical <User Schedule>  dextrose 5%. 1000 milliLiter(s) (50 mL/Hr) IV Continuous <Continuous>  dextrose 5%. 1000 milliLiter(s) (100 mL/Hr) IV Continuous <Continuous>  dextrose 50% Injectable 12.5 Gram(s) IV Push once  dextrose 50% Injectable 25 Gram(s) IV Push once  dextrose 50% Injectable 25 Gram(s) IV Push once  glucagon  Injectable 1 milliGRAM(s) IntraMuscular once  heparin   Injectable 5000 Unit(s) SubCutaneous three times a day  influenza  Vaccine (HIGH DOSE) 0.7 milliLiter(s) IntraMuscular once  insulin lispro (ADMELOG) corrective regimen sliding scale   SubCutaneous at bedtime  insulin lispro (ADMELOG) corrective regimen sliding scale   SubCutaneous three times a day before meals  lactated ringers. 500 milliLiter(s) (100 mL/Hr) IV Continuous <Continuous>    MEDICATIONS  (PRN):  dextrose Oral Gel 15 Gram(s) Oral once PRN Blood Glucose LESS THAN 70 milliGRAM(s)/deciliter      I&O's Summary    27 Sep 2023 07:01  -  28 Sep 2023 07:00  --------------------------------------------------------  IN: 500 mL / OUT: 350 mL / NET: 150 mL    28 Sep 2023 07:01  -  28 Sep 2023 13:31  --------------------------------------------------------  IN: 480 mL / OUT: 0 mL / NET: 480 mL        PHYSICAL EXAM:  Vital Signs Last 24 Hrs  T(C): 36.7 (28 Sep 2023 11:28), Max: 36.9 (27 Sep 2023 20:28)  T(F): 98.1 (28 Sep 2023 11:28), Max: 98.5 (27 Sep 2023 20:28)  HR: 57 (28 Sep 2023 11:28) (50 - 73)  BP: 155/79 (28 Sep 2023 11:28) (133/63 - 155/79)  BP(mean): --  RR: 18 (28 Sep 2023 11:28) (17 - 19)  SpO2: 97% (28 Sep 2023 11:28) (95% - 98%)    Parameters below as of 28 Sep 2023 11:28  Patient On (Oxygen Delivery Method): room air      CONSTITUTIONAL: NAD, well-developed, well-groomed  EYES: PERRLA; conjunctiva and sclera clear  ENMT: Moist oral mucosa, no pharyngeal injection or exudates;   NECK: Supple, no palpable masses; no thyromegaly  RESPIRATORY: Normal respiratory effort; lungs are clear to auscultation bilaterally  CARDIOVASCULAR: Regular rate and rhythm, normal S1 and S2, no murmur/rub/gallop; No lower extremity edema;   ABDOMEN: Nontender to palpation, normoactive bowel sounds, no rebound/guarding;   MUSCULOSKELETAL:  Normal gait; no clubbing or cyanosis of digits; no joint swelling or tenderness to palpation  PSYCH: A+O to person, place, and time; affect appropriate  NEUROLOGY: CN 2-12 are intact and symmetric; no gross sensory deficits   SKIN: No rashes; no palpable lesions    LABS:                        10.4   7.62  )-----------( 174      ( 27 Sep 2023 07:22 )             33.3     09-28    141  |  108  |  46<H>  ----------------------------<  259<H>  4.4   |  18<L>  |  2.01<H>    Ca    9.3      28 Sep 2023 07:24  Phos  3.5     09-27  Mg     1.7     09-27    TPro  7.1  /  Alb  3.7  /  TBili  0.9  /  DBili  x   /  AST  13  /  ALT  13  /  AlkPhos  61  09-27    PT/INR - ( 26 Sep 2023 21:45 )   PT: 11.1 sec;   INR: 1.06 ratio         PTT - ( 26 Sep 2023 21:45 )  PTT:28.2 sec      Urinalysis Basic - ( 28 Sep 2023 07:24 )    Color: x / Appearance: x / SG: x / pH: x  Gluc: 259 mg/dL / Ketone: x  / Bili: x / Urobili: x   Blood: x / Protein: x / Nitrite: x   Leuk Esterase: x / RBC: x / WBC x   Sq Epi: x / Non Sq Epi: x / Bacteria: x

## 2023-09-28 NOTE — PROVIDER CONTACT NOTE (OTHER) - RECOMMENDATIONS
Monitor vital signs, check glucose  and  tele, apply supplemental air for comfort, maintain safety to prevent falls and continue to plan of care. Monitor vital signs, check glucose  and  tele, apply supplemental air for comfort, maintain safety to prevent falls and continue plan of care.

## 2023-09-28 NOTE — CONSULT NOTE ADULT - SUBJECTIVE AND OBJECTIVE BOX
Nasim Rosales MD  Cardiology Fellow  All Cardiology service information can be found 24/7 on [email or web address], password: [email or web address]    Patient seen and evaluated at bedside    Chief Complaint:    HPI:  67F hx HTN, DM2, recent hospitalization @ [facility] (9/24-9/25/23?) for sob and weakness, pw sob, lightheadedness, weakness, hypotension.    Pt states she was in usual state of health until 10 days ago, when her symptoms started. Pt states that on stand and walking, pt gets whole body weakness, lightheaded, palpitations. Pt states she checked her blood pressure and it was SBP ~88. Pt also reports SOB on exertion and on lying flat. Pt presented to OSH where she was found to have Cr of 1.7-1.8, hb of ~10, TTE grossly normal with EF 76%, renal ultrasound without hydronephrosis. All these results from OSH are on pt's phone. On discharge from OSH, pt was told to stop her losartan and metoprolol. Pt states her symptoms did not get any better, which brings her into [facility].  Pt reports flying back from [facility] in Jun '23, and while in [facility] she had leg swelling, but the swelling has since resolved. Pt denies fevers, CP, diarrhea, URI symptoms. Pt also denies focal arm/leg weakness. Pt reports her last stress test was 1 year ago, but it was terminated early as she was not feeling well from having covid at that time. unknown result of stress test. (27 Sep 2023 04:48)    Cardiology was consulted for 2:1 block seen on telemetry this evening. The patient endorses the history above, and reports that for the past 10 days she has been becoming very weak. She describes a pinching chest pain associated with her symptoms. She has never been told she has any kind of arrhythmia before. She states that her symptoms occur even with minimal activity now. She denies any recent exposure to wooded areas or tick bites, but endorses many mosquito bites on her trip to [facility] in June. She has seen a cardiologist in the remote past for palpitations, but was told that nothing was wrong at that time. She stopped taking her medications losartan and metoprolol after her prior hospitalization, however she did take one dose prior to admission.     While admitted    PMHx:   DM2 (diabetes mellitus, type 2)    HTN (hypertension)        PSHx:   S/P appendectomy        Allergies:  No Known Allergies      Home Medications:  glimepiride 4 mg oral tablet: 1 tab(s) orally once a day (27 Sep 2023 04:49)  metFORMIN 500 mg oral tablet: 1 tab(s) orally 2 times a day (27 Sep 2023 04:49)  pravastatin 20 mg oral tablet: 1 tab(s) orally once a day (27 Sep 2023 04:49)      Current Medications:   dextrose 5%. 1000 milliLiter(s) IV Continuous <Continuous>  dextrose 5%. 1000 milliLiter(s) IV Continuous <Continuous>  dextrose 50% Injectable 12.5 Gram(s) IV Push once  dextrose 50% Injectable 25 Gram(s) IV Push once  dextrose 50% Injectable 25 Gram(s) IV Push once  dextrose Oral Gel 15 Gram(s) Oral once PRN  glucagon  Injectable 1 milliGRAM(s) IntraMuscular once  heparin   Injectable 5000 Unit(s) SubCutaneous three times a day  influenza  Vaccine (HIGH DOSE) 0.7 milliLiter(s) IntraMuscular once  insulin lispro (ADMELOG) corrective regimen sliding scale   SubCutaneous at bedtime  insulin lispro (ADMELOG) corrective regimen sliding scale   SubCutaneous three times a day before meals  lactated ringers. 500 milliLiter(s) IV Continuous <Continuous>      FAMILY HISTORY:  Denies family history of cardiac disease    Social History: Lives alone, is   Smoking History: Denies  Alcohol Use: Denies  Drug Use: Denies    REVIEW OF SYSTEMS:  CONSTITUTIONAL: +weakness, no fevers or chills  EYES/ENT: +loss of vision with episodes of weakness  RESPIRATORY: + shortness of breath and orthopnea  CARDIOVASCULAR: +pinching chest pain + lower extremity edema now resolved  SKIN: No itching, burning, rashes, or lesions   All other review of systems is negative unless indicated above.    Physical Exam:  T(F): 98.5 (09-27), Max: 98.5 (09-27)  HR: 66 (09-27) (54 - 74)  BP: 133/63 (09-27) (120/75 - 156/56)  RR: 18 (09-27)  SpO2: 95% (09-27)  GENERAL: No acute distress, well-developed  HEAD:  Atraumatic, Normocephalic  ENT: EOMI, PERRLA, conjunctiva and sclera clear, Neck supple, No JVD, moist mucosa  CHEST/LUNG: Clear to auscultation bilaterally; No wheeze, equal breath sounds bilaterally   HEART: Regular rate and rhythm;  4/6 holosystolic murmur throughout precordium  ABDOMEN: Soft, Nontender, Nondistended; Bowel sounds present  EXTREMITIES:  No clubbing, cyanosis, or edema  PSYCH: Nl behavior, nl affect  NEUROLOGY: AAOx3, non-focal, cranial nerves intact  SKIN: Normal color, No rashes or lesions  LINES:    Cardiovascular Diagnostic Testing:    ECG: Personally reviewed:  2:1 AV block HR 55    Telemetry: 2:1 AV block HR 50s with occasional 3:2 in Mobitz 1 pattern    Echo: Personally reviewed:    Stress Testing:    Cath:    Imaging:    CXR: Personally reviewed    Labs: Personally reviewed                        10.4   7.62  )-----------( 174      ( 27 Sep 2023 07:22 )             33.3     09-27    142  |  108  |  34<H>  ----------------------------<  214<H>  4.5   |  17<L>  |  1.75<H>    Ca    9.3      27 Sep 2023 07:22  Phos  3.5     09-27  Mg     1.7     09-27    TPro  7.1  /  Alb  3.7  /  TBili  0.9  /  DBili  x   /  AST  13  /  ALT  13  /  AlkPhos  61  09-27    PT/INR - ( 26 Sep 2023 21:45 )   PT: 11.1 sec;   INR: 1.06 ratio         PTT - ( 26 Sep 2023 21:45 )  PTT:28.2 sec    CARDIAC MARKERS ( 27 Sep 2023 07:22 )  28 ng/L / x     / x     / x     / x     / x      CARDIAC MARKERS ( 26 Sep 2023 21:45 )  28 ng/L / x     / x     / x     / x     / x

## 2023-09-28 NOTE — CONSULT NOTE ADULT - SUBJECTIVE AND OBJECTIVE BOX
[facility] DIVISION OF KIDNEY DISEASES AND HYPERTENSION -- [phone]  -- INITIAL CONSULT NOTE  --------------------------------------------------------------------------------  HPI: This is a 67-year-old female with medical hx of HTN, NIDDM-Type 2, and HLD who presented  for weakness, hypotension, CAGE, and orthopnea. She initially presented to [facility] (9/24-9/25/23?). Pt states she was in usual state of health until 10 days ago, when her symptoms started. Pt states that upon standing and walking, pt gets whole body weakness, lightheaded, and vision blurring. Pt states she checked her blood pressure and it was SBP ~88. Pt also reports SOB on exertion and on lying flat. Pt presented to OSH where she was found to have Cr of 1.7-1.8, hgb of ~10, TTE grossly normal with EF 76%, renal ultrasound without hydronephrosis. All these results from OSH are on pt's phone. On discharge from OSH, pt was told to stop her losartan and metoprolol. Pt states her symptoms did not get any better, which brings her into [facility].  Pt reports flying back from [facility] in Jun '23, and while in [facility] she had leg swelling, but the swelling has since resolved. Pt denies fevers, CP, diarrhea, URI symptoms. Pt also denies focal arm/leg weakness. Pt reports her last stress test was 1 year ago, but it was terminated early as she was not feeling well from having covid at that time. unknown result of stress test.    Upon presentation to [facility] on 9/26, patient was hypertensive 156/71 and tachypneic RR 22 with SpO2 97% on room air. Labs showed a mild hyperchloremic non-anion gap metabolic acidosis with normal lactate and Cr 1.72. BNP elevated >8k. Trops and procal wnl. ESR 74. D-dimer and V/Q scan low probability for PE. COVID/RVP negative. CXR normal. UA bland except for significant glucosuria. Renal US normal. Received Lasix 40mg IV on 9/26 and 500cc LR on 5/27.    Nephrology service consulted for LOPEZ. Patient denies kidney disease, unclear baseline Cr. Takes Losartan, Metop, Metformin, Glipizide, and Pravastatin. Denies being on diuretic or SGLT-2 inhibitor.        PAST HISTORY  --------------------------------------------------------------------------------  PAST MEDICAL & SURGICAL HISTORY:  DM2 (diabetes mellitus, type 2)      HTN (hypertension)      S/P appendectomy        FAMILY HISTORY:    PAST SOCIAL HISTORY:    ALLERGIES & MEDICATIONS  --------------------------------------------------------------------------------  Allergies    No Known Allergies    Intolerances      Standing Inpatient Medications  chlorhexidine 2% Cloths 1 Application(s) Topical <User Schedule>  dextrose 5%. 1000 milliLiter(s) IV Continuous <Continuous>  dextrose 5%. 1000 milliLiter(s) IV Continuous <Continuous>  dextrose 50% Injectable 12.5 Gram(s) IV Push once  dextrose 50% Injectable 25 Gram(s) IV Push once  dextrose 50% Injectable 25 Gram(s) IV Push once  glucagon  Injectable 1 milliGRAM(s) IntraMuscular once  heparin   Injectable 5000 Unit(s) SubCutaneous three times a day  influenza  Vaccine (HIGH DOSE) 0.7 milliLiter(s) IntraMuscular once  insulin lispro (ADMELOG) corrective regimen sliding scale   SubCutaneous three times a day before meals  insulin lispro (ADMELOG) corrective regimen sliding scale   SubCutaneous at bedtime  lactated ringers. 500 milliLiter(s) IV Continuous <Continuous>    PRN Inpatient Medications  dextrose Oral Gel 15 Gram(s) Oral once PRN      REVIEW OF SYSTEMS  --------------------------------------------------------------------------------  Constitutional: No fevers/chills  HEENT: No HA, sore throat   Respiratory: +CAGE, orthopnea  Cardiovascular: No chest pain  Gastrointestinal: No abdominal pain, diarrhea, nausea, vomiting  Genitourinary: No dysuria, hematuria, urgency  Extremities: +edema  Skin: No rashes  Heme: No easy bruising or bleeding    All other systems were reviewed and are negative, except as noted.    VITALS  --------------------------------------------------------------------------------  T(C): 36.5 (09-28-23 @ 20:53), Max: 36.9 (09-28-23 @ 14:39)  HR: 58 (09-28-23 @ 20:53) (50 - 61)  BP: 148/90 (09-28-23 @ 20:53) (128/75 - 155/79)  RR: 18 (09-28-23 @ 20:53) (17 - 18)  SpO2: 98% (09-28-23 @ 20:53) (96% - 99%)  Wt(kg): --      09-27-23 @ 07:01  -  09-28-23 @ 07:00  --------------------------------------------------------  IN: 500 mL / OUT: 350 mL / NET: 150 mL    09-28-23 @ 07:01  -  09-28-23 @ 22:17  --------------------------------------------------------  IN: 480 mL / OUT: 0 mL / NET: 480 mL      PHYSICAL EXAM:  General: no acute distress  Neuro: no focal deficits  HEENT: anicteric, +JVD  Pulmonary: lungs CTA B/L  Cardiovascular/Chest: +S1S2, RRR, no murmurs/rubs/gallops  GI/Abdomen: soft, non-distended, non-tender, +bowel sounds  Extremities: bilateral LE pitting edema  Skin: Warm and dry    LABS/STUDIES  --------------------------------------------------------------------------------              10.2   6.38  >-----------<  180      [09-28-23 @ 15:04]              31.9     139  |  106  |  44  ----------------------------<  364      [09-28-23 @ 15:04]  4.2   |  20  |  2.01        Ca     9.6     [09-28-23 @ 15:04]      Mg     1.7     [09-27-23 @ 07:22]      Phos  3.5     [09-27-23 @ 07:22]    TPro  7.1  /  Alb  3.7  /  TBili  0.9  /  DBili  x   /  AST  13  /  ALT  13  /  AlkPhos  61  [09-27-23 @ 07:22]              [09-27-23 @ 07:22]    Creatinine Trend:  SCr 2.01 [09-28 @ 15:04]  SCr 2.01 [09-28 @ 07:24]  SCr 1.75 [09-27 @ 07:22]  SCr 1.72 [09-26 @ 21:45]    Urinalysis - [09-28-23 @ 17:43]      Color Light Yellow / Appearance Clear / SG 1.019 / pH 5.5      Gluc 1000 mg/dL / Ketone Negative  / Bili Negative / Urobili Negative       Blood Negative / Protein Trace / Leuk Est Negative / Nitrite Negative      RBC  / WBC  / Hyaline  / Gran  / Sq Epi  / Non Sq Epi  / Bacteria     Urine Creatinine 110      [09-28-23 @ 17:35]  Urine Protein 14      [09-28-23 @ 17:35]  Urine Sodium 55      [09-28-23 @ 17:35]  Urine Potassium 27      [09-28-23 @ 17:35]  Urine Chloride 37      [09-28-23 @ 17:35]    HCV 0.10, Nonreact      [09-28-23 @ 07:24]

## 2023-09-28 NOTE — CONSULT NOTE ADULT - ASSESSMENT
This is a 67-year-old female with medical hx of HTN, NIDDM-Type 2, and HLD who presented  for weakness, hypotension, CAGE, and orthopnea.   Upon presentation to [facility] on 9/26, patient was hypertensive 156/71 and tachypneic RR 22 with SpO2 97% on room air. Labs showed a mild hyperchloremic non-anion gap metabolic acidosis with normal lactate and Cr 1.72. BNP elevated >8k. Trops and procal wnl. ESR 74. D-dimer and V/Q scan low probability for PE. COVID/RVP negative. CXR normal. UA bland except for significant glucosuria. Renal US normal. Received Lasix 40mg IV on 9/26 and 500cc LR on 5/27. Cr trending up.    Nephrology service consulted for LOPEZ. Patient denies kidney disease, unclear baseline Cr. Takes Losartan, Metop, Metformin, Glipizide, and Pravastatin. Denies being on diuretic or SGLT-2 inhibitor.

## 2023-09-28 NOTE — CONSULT NOTE ADULT - PROBLEM SELECTOR RECOMMENDATION 9
Appears to be cardiorenal. Urine lytes suggestive of prerenal etiology. BNP elevated. While CXR clear, patient appears hypervolemic on exam (elevated JVP, LE pitting edema, orthopnea). BPs elevated. Renal US normal. Unclear baseline Cr. Patient appears to have uncontrolled DM and states she has vision problems but is unaware if she has diabetic retinopathy. UA bland, but has significant glucosuria, denied SGLT-2i use.    Plan:  -Would give another dose of Lasix 40mg IVP as BP tolerates. Will need to monitor STRICT Is+Os.   -Please bladder scan for PVR and cath if necessary  -Consider CT chest WITHOUT contrast to get better view of lung parenchyma.  -Would get ABG in the AM  -Agree with cardiac w/u including repeat TTE and NM stress test. Trops wnl.  -Agree with holding Losartan for now  -check A1c, needs better glycemic control    Recs not finalized until note signed by the attending.    Brandon Tripp, DO  Nephrology Fellow  Feel free to contact me directly on TEAMS with any questions.  (After 5pm or on weekends, please call the on-call fellow). Appears to be cardiorenal. Urine lytes suggestive of prerenal etiology. BNP elevated. While CXR clear, patient appears hypervolemic on exam (elevated JVP, LE pitting edema, orthopnea). BPs elevated. Orthostats negative. Renal US normal. Unclear baseline Cr. Patient appears to have uncontrolled DM and states she has vision problems but is unaware if she has diabetic retinopathy. UA bland, but has significant glucosuria, denied SGLT-2i use.    Plan:  -Would give another dose of Lasix 40mg IVP as BP tolerates. Will need to monitor STRICT Is+Os.   -Please bladder scan for PVR and cath if necessary  -Consider CT chest WITHOUT contrast to get better view of lung parenchyma.  -Would get ABG in the AM  -Agree with cardiac w/u including repeat TTE and NM stress test. Trops wnl.  -Agree with holding Losartan for now  -check A1c, needs better glycemic control    Recs not finalized until note signed by the attending.    Brandon Tripp, DO  Nephrology Fellow  Feel free to contact me directly on TEAMS with any questions.  (After 5pm or on weekends, please call the on-call fellow).

## 2023-09-28 NOTE — PROVIDER CONTACT NOTE (OTHER) - ASSESSMENT
Upon assessment patient was found to have a bp of 138/83 HR 50, o2 sat of 97% on room air and glucose was 233. Upon assessment patient was found to have a bp of 138/83 HR 50, o2 sat of 97% on room air and glucose was 231.

## 2023-09-29 LAB
ANION GAP SERPL CALC-SCNC: 14 MMOL/L — SIGNIFICANT CHANGE UP (ref 5–17)
BASE EXCESS BLDA CALC-SCNC: -2.9 MMOL/L — LOW (ref -2–3)
BUN SERPL-MCNC: 51 MG/DL — HIGH (ref 7–23)
CALCIUM SERPL-MCNC: 9.5 MG/DL — SIGNIFICANT CHANGE UP (ref 8.4–10.5)
CHLORIDE SERPL-SCNC: 102 MMOL/L — SIGNIFICANT CHANGE UP (ref 96–108)
CO2 BLDA-SCNC: 24 MMOL/L — SIGNIFICANT CHANGE UP (ref 19–24)
CO2 SERPL-SCNC: 20 MMOL/L — LOW (ref 22–31)
CREAT SERPL-MCNC: 1.96 MG/DL — HIGH (ref 0.5–1.3)
EGFR: 28 ML/MIN/1.73M2 — LOW
GAS PNL BLDA: SIGNIFICANT CHANGE UP
GLUCOSE BLDC GLUCOMTR-MCNC: 255 MG/DL — HIGH (ref 70–99)
GLUCOSE BLDC GLUCOMTR-MCNC: 277 MG/DL — HIGH (ref 70–99)
GLUCOSE BLDC GLUCOMTR-MCNC: 291 MG/DL — HIGH (ref 70–99)
GLUCOSE BLDC GLUCOMTR-MCNC: 341 MG/DL — HIGH (ref 70–99)
GLUCOSE SERPL-MCNC: 301 MG/DL — HIGH (ref 70–99)
HCO3 BLDA-SCNC: 23 MMOL/L — SIGNIFICANT CHANGE UP (ref 21–28)
HCT VFR BLD CALC: 31.7 % — LOW (ref 34.5–45)
HGB BLD-MCNC: 10.2 G/DL — LOW (ref 11.5–15.5)
HOROWITZ INDEX BLDA+IHG-RTO: 21 — SIGNIFICANT CHANGE UP
MCHC RBC-ENTMCNC: 26.5 PG — LOW (ref 27–34)
MCHC RBC-ENTMCNC: 32.2 GM/DL — SIGNIFICANT CHANGE UP (ref 32–36)
MCV RBC AUTO: 82.3 FL — SIGNIFICANT CHANGE UP (ref 80–100)
MRSA PCR RESULT.: SIGNIFICANT CHANGE UP
NRBC # BLD: 0 /100 WBCS — SIGNIFICANT CHANGE UP (ref 0–0)
PCO2 BLDA: 41 MMHG — SIGNIFICANT CHANGE UP (ref 32–45)
PH BLDA: 7.35 — SIGNIFICANT CHANGE UP (ref 7.35–7.45)
PLATELET # BLD AUTO: 180 K/UL — SIGNIFICANT CHANGE UP (ref 150–400)
PO2 BLDA: 87 MMHG — SIGNIFICANT CHANGE UP (ref 83–108)
POTASSIUM SERPL-MCNC: 3.7 MMOL/L — SIGNIFICANT CHANGE UP (ref 3.5–5.3)
POTASSIUM SERPL-SCNC: 3.7 MMOL/L — SIGNIFICANT CHANGE UP (ref 3.5–5.3)
RBC # BLD: 3.85 M/UL — SIGNIFICANT CHANGE UP (ref 3.8–5.2)
RBC # FLD: 13.9 % — SIGNIFICANT CHANGE UP (ref 10.3–14.5)
S AUREUS DNA NOSE QL NAA+PROBE: SIGNIFICANT CHANGE UP
SAO2 % BLDA: 96.7 % — SIGNIFICANT CHANGE UP (ref 94–98)
SODIUM SERPL-SCNC: 136 MMOL/L — SIGNIFICANT CHANGE UP (ref 135–145)
WBC # BLD: 6.93 K/UL — SIGNIFICANT CHANGE UP (ref 3.8–10.5)
WBC # FLD AUTO: 6.93 K/UL — SIGNIFICANT CHANGE UP (ref 3.8–10.5)

## 2023-09-29 PROCEDURE — 99232 SBSQ HOSP IP/OBS MODERATE 35: CPT | Mod: GC

## 2023-09-29 PROCEDURE — 99232 SBSQ HOSP IP/OBS MODERATE 35: CPT

## 2023-09-29 PROCEDURE — 99222 1ST HOSP IP/OBS MODERATE 55: CPT | Mod: GC

## 2023-09-29 PROCEDURE — 71250 CT THORAX DX C-: CPT | Mod: 26

## 2023-09-29 RX ADMIN — CHLORHEXIDINE GLUCONATE 1 APPLICATION(S): 213 SOLUTION TOPICAL at 05:43

## 2023-09-29 RX ADMIN — HEPARIN SODIUM 5000 UNIT(S): 5000 INJECTION INTRAVENOUS; SUBCUTANEOUS at 05:43

## 2023-09-29 RX ADMIN — HEPARIN SODIUM 5000 UNIT(S): 5000 INJECTION INTRAVENOUS; SUBCUTANEOUS at 22:01

## 2023-09-29 RX ADMIN — Medication 3: at 11:45

## 2023-09-29 RX ADMIN — Medication 4: at 17:41

## 2023-09-29 RX ADMIN — Medication 3: at 08:07

## 2023-09-29 RX ADMIN — HEPARIN SODIUM 5000 UNIT(S): 5000 INJECTION INTRAVENOUS; SUBCUTANEOUS at 15:07

## 2023-09-29 RX ADMIN — Medication 1: at 22:01

## 2023-09-29 NOTE — DIETITIAN INITIAL EVALUATION ADULT - OTHER INFO
Reports -190 lbs. Denies recent wt changes.   Dosing wt: 179.8 lbs (09-26)  Wt history per chart: 190.9 lbs (09-29, standing). RD to continue to monitor weight trends as able/available.     Per chart, pt currently ordered for admelog ISS in-house.

## 2023-09-29 NOTE — DIETITIAN INITIAL EVALUATION ADULT - PERTINENT MEDS FT
MEDICATIONS  (STANDING):  chlorhexidine 2% Cloths 1 Application(s) Topical <User Schedule>  dextrose 5%. 1000 milliLiter(s) (50 mL/Hr) IV Continuous <Continuous>  dextrose 5%. 1000 milliLiter(s) (100 mL/Hr) IV Continuous <Continuous>  dextrose 50% Injectable 12.5 Gram(s) IV Push once  dextrose 50% Injectable 25 Gram(s) IV Push once  dextrose 50% Injectable 25 Gram(s) IV Push once  glucagon  Injectable 1 milliGRAM(s) IntraMuscular once  heparin   Injectable 5000 Unit(s) SubCutaneous three times a day  influenza  Vaccine (HIGH DOSE) 0.7 milliLiter(s) IntraMuscular once  insulin lispro (ADMELOG) corrective regimen sliding scale   SubCutaneous at bedtime  insulin lispro (ADMELOG) corrective regimen sliding scale   SubCutaneous three times a day before meals  lactated ringers. 500 milliLiter(s) (100 mL/Hr) IV Continuous <Continuous>    MEDICATIONS  (PRN):  dextrose Oral Gel 15 Gram(s) Oral once PRN Blood Glucose LESS THAN 70 milliGRAM(s)/deciliter

## 2023-09-29 NOTE — DIETITIAN INITIAL EVALUATION ADULT - NSFNSADHERENCEPTAFT_GEN_A_CORE
Pt noted with hx of DM2. Reports taking metformin and glimepiride PTA. Reports checking fingersticks 2x/day with typical readings ~120-125 mg/dl. A1c pending. Pt reports recent A1c ~8%.

## 2023-09-29 NOTE — DIETITIAN INITIAL EVALUATION ADULT - PERTINENT LABORATORY DATA
09-29    136  |  102  |  51<H>  ----------------------------<  301<H>  3.7   |  20<L>  |  1.96<H>    Ca    9.5      29 Sep 2023 10:30    CAPILLARY BLOOD GLUCOSE  POCT Blood Glucose.: 291 mg/dL (29 Sep 2023 11:42)  POCT Blood Glucose.: 255 mg/dL (29 Sep 2023 07:50)  POCT Blood Glucose.: 188 mg/dL (28 Sep 2023 21:11)  POCT Blood Glucose.: 333 mg/dL (28 Sep 2023 17:22)

## 2023-09-29 NOTE — DIETITIAN INITIAL EVALUATION ADULT - REASON FOR ADMISSION
Dyspnea    Per chart, 67F c hx HTN, DM2, recent hospitalization @ [facility] (9/24-9/25/23?) for sob and weakness, pw sob, lightheadedness, weakness, hypotension of unclear etiol

## 2023-09-29 NOTE — DIETITIAN INITIAL EVALUATION ADULT - NSFNSNUTRHOMESUPPLEMENTFT_GEN_A_CORE
Pt reports use of Vitamin D3 and Vitamin B12 supplements PTA. Denies use of any additional nutrition/dietary supplements PTA.

## 2023-09-29 NOTE — DIETITIAN INITIAL EVALUATION ADULT - ORAL INTAKE PTA/DIET HISTORY
Pt reports good appetite/PO intake PTA, was not following any therapeutic diet PTA though was trying to limit her portion sizes.   Confirms NKFA.

## 2023-09-29 NOTE — PROGRESS NOTE ADULT - SUBJECTIVE AND OBJECTIVE BOX
[facility] Division of Hospital Medicine  Jelena Bryant MD  MS Teams PREFERRED        SUBJECTIVE / OVERNIGHT EVENTS: Seen and examined at bedside. NAD.    MEDICATIONS  (STANDING):  chlorhexidine 2% Cloths 1 Application(s) Topical <User Schedule>  dextrose 5%. 1000 milliLiter(s) (50 mL/Hr) IV Continuous <Continuous>  dextrose 5%. 1000 milliLiter(s) (100 mL/Hr) IV Continuous <Continuous>  dextrose 50% Injectable 12.5 Gram(s) IV Push once  dextrose 50% Injectable 25 Gram(s) IV Push once  dextrose 50% Injectable 25 Gram(s) IV Push once  glucagon  Injectable 1 milliGRAM(s) IntraMuscular once  heparin   Injectable 5000 Unit(s) SubCutaneous three times a day  influenza  Vaccine (HIGH DOSE) 0.7 milliLiter(s) IntraMuscular once  insulin lispro (ADMELOG) corrective regimen sliding scale   SubCutaneous three times a day before meals  insulin lispro (ADMELOG) corrective regimen sliding scale   SubCutaneous at bedtime  lactated ringers. 500 milliLiter(s) (100 mL/Hr) IV Continuous <Continuous>    MEDICATIONS  (PRN):  dextrose Oral Gel 15 Gram(s) Oral once PRN Blood Glucose LESS THAN 70 milliGRAM(s)/deciliter      I&O's Summary    28 Sep 2023 07:01  -  29 Sep 2023 07:00  --------------------------------------------------------  IN: 480 mL / OUT: 1830 mL / NET: -1350 mL    29 Sep 2023 07:01  -  29 Sep 2023 13:06  --------------------------------------------------------  IN: 400 mL / OUT: 600 mL / NET: -200 mL        PHYSICAL EXAM:  Vital Signs Last 24 Hrs  T(C): 36.5 (29 Sep 2023 11:30), Max: 36.9 (28 Sep 2023 14:39)  T(F): 97.7 (29 Sep 2023 11:30), Max: 98.5 (28 Sep 2023 17:09)  HR: 50 (29 Sep 2023 11:30) (50 - 64)  BP: 144/78 (29 Sep 2023 11:30) (128/75 - 153/82)  BP(mean): --  RR: 18 (29 Sep 2023 11:30) (18 - 18)  SpO2: 95% (29 Sep 2023 11:30) (95% - 99%)    Parameters below as of 29 Sep 2023 11:30  Patient On (Oxygen Delivery Method): room air      CONSTITUTIONAL: NAD, well-developed, well-groomed  EYES: PERRLA; conjunctiva and sclera clear  ENMT: Moist oral mucosa, no pharyngeal injection or exudates;   NECK: Supple, no palpable masses; no thyromegaly  RESPIRATORY: Normal respiratory effort; lungs are clear to auscultation bilaterally  CARDIOVASCULAR: Regular rate and rhythm, normal S1 and S2, no murmur/rub/gallop; No lower extremity edema;   ABDOMEN: Nontender to palpation, normoactive bowel sounds, no rebound/guarding;   MUSCULOSKELETAL:  Normal gait; no clubbing or cyanosis of digits; no joint swelling or tenderness to palpation  PSYCH: A+O to person, place, and time; affect appropriate  NEUROLOGY: CN 2-12 are intact and symmetric; no gross sensory deficits   SKIN: No rashes; no palpable lesions    LABS:                        10.2   6.93  )-----------( 180      ( 29 Sep 2023 10:31 )             31.7     09-29    136  |  102  |  51<H>  ----------------------------<  301<H>  3.7   |  20<L>  |  1.96<H>    Ca    9.5      29 Sep 2023 10:30            Urinalysis Basic - ( 29 Sep 2023 10:30 )    Color: x / Appearance: x / SG: x / pH: x  Gluc: 301 mg/dL / Ketone: x  / Bili: x / Urobili: x   Blood: x / Protein: x / Nitrite: x   Leuk Esterase: x / RBC: x / WBC x   Sq Epi: x / Non Sq Epi: x / Bacteria: x

## 2023-09-29 NOTE — DIETITIAN INITIAL EVALUATION ADULT - NS FNS DIET ORDER
Diet, DASH/TLC:   Sodium & Cholesterol Restricted  Consistent Carbohydrate {Evening Snack} (CSTCHOSN)  No Caffeine  No Carbonated Beverages  No Chocolate (09-28-23 @ 10:35) [Active]

## 2023-09-29 NOTE — DIETITIAN INITIAL EVALUATION ADULT - CALCULATED TO (CAL/KG)
normal/mildly increased tone/bilateral upper extremities/bilateral lower extremities/right hand resting tremors noted, no intention tremors.
1836

## 2023-09-29 NOTE — DIETITIAN INITIAL EVALUATION ADULT - ADD RECOMMEND
1) Continue DASH, Consistent Carbohydrate diet.  2) Monitor PO intake, GI tolerance, skin integrity, labs, weight, and bowel movement regularity.   3) Honor food preferences as feasible. Assist with meals PRN and encourage PO intake.  4) RD remains available upon request and will follow-up per protocol.

## 2023-09-29 NOTE — DIETITIAN INITIAL EVALUATION ADULT - NSFNSGIIOFT_GEN_A_CORE
Denies nausea, vomiting, constipation, diarrhea. Reports last BM 9/28. Pt not currently ordered for bowel regimen.

## 2023-09-29 NOTE — PROGRESS NOTE ADULT - SUBJECTIVE AND OBJECTIVE BOX
Cardiology Progress Note  ------------------------------------------------------------------------------------------    SUBJECTIVE/EVENTS::  - Tele: No events, 3:1 AV block c/w 2nd degree AV block type 1  - NAEO    -------------------------------------------------------------------------------------------  ROS:  CV: chest pain (-), palpitation (-), orthopnea (-), PND (-), edema (-)  PULM: SOB (+), cough (-), wheezing (-), hemoptysis (-).   CONST: fever (-), chills (-) or fatigue (-)  GI: abdominal distension (-), abdominal pain (-) , nausea/vomiting (-), hematemesis, (-), melena (-), hematochezia (-)  : dysuria (-), frequency (-), hematuria (-).   NEURO: numbness (-), weakness (-), dizziness (-)  MSK: myalgia (-), joint pain (-)   SKIN: itching (-), rash (-)  HEENT:  visual changes (-); vertigo or throat pain (-);  neck stiffness (-)   Psych: change in mood (-), anxiety (+), depression (-)     All other review of systems is negative unless indicated above.   -------------------------------------------------------------------------------------------  VS:  T(F): 97.4 (09-29), Max: 98.5 (09-28)  HR: 64 (09-29) (51 - 64)  BP: 145/80 (09-29) (128/75 - 155/79)  RR: 18 (09-29)  SpO2: 97% (09-29)  I&O's Summary    28 Sep 2023 07:01  -  29 Sep 2023 07:00  --------------------------------------------------------  IN: 480 mL / OUT: 1830 mL / NET: -1350 mL      ------------------------------------------------------------------------------------------  PHYSICAL EXAM:  GEN: NAD, sitting in bed  HEENT: NCAT, EOMI  CV: RRR, Ns1/s2, 3/6 systolic murmur, JVP not elevated  RESP: CTA B/l, no w/r/r  ABD: soft, nt, nd  Ext: warm, 2+ pulses, no edema  Neuro: AAOx3, no focal defcits    -------------------------------------------------------------------------------------------  LABS:                          10.2   6.38  )-----------( 180      ( 28 Sep 2023 15:04 )             31.9     09-28    139  |  106  |  44<H>  ----------------------------<  364<H>  4.2   |  20<L>  |  2.01<H>    Ca    9.6      28 Sep 2023 15:04        CARDIAC MARKERS ( 27 Sep 2023 07:22 )  28 ng/L / x     / x     / x     / x     / x      CARDIAC MARKERS ( 26 Sep 2023 21:45 )  28 ng/L / x     / x     / x     / x     / x        -------------------------------------------------------------------------------------------  Meds:  chlorhexidine 2% Cloths 1 Application(s) Topical <User Schedule>  dextrose 5%. 1000 milliLiter(s) IV Continuous <Continuous>  dextrose 5%. 1000 milliLiter(s) IV Continuous <Continuous>  dextrose 50% Injectable 12.5 Gram(s) IV Push once  dextrose 50% Injectable 25 Gram(s) IV Push once  dextrose 50% Injectable 25 Gram(s) IV Push once  dextrose Oral Gel 15 Gram(s) Oral once PRN  glucagon  Injectable 1 milliGRAM(s) IntraMuscular once  heparin   Injectable 5000 Unit(s) SubCutaneous three times a day  influenza  Vaccine (HIGH DOSE) 0.7 milliLiter(s) IntraMuscular once  insulin lispro (ADMELOG) corrective regimen sliding scale   SubCutaneous at bedtime  insulin lispro (ADMELOG) corrective regimen sliding scale   SubCutaneous three times a day before meals  lactated ringers. 500 milliLiter(s) IV Continuous <Continuous>    -------------------------------------------------------------------------------------------  Cardiovascular Diagnostic Testing:    Tele: 2:1 and 3:1 AV block, c/w Mobitz type 1    -------------------------------------------------------------------------------------------  Assessment and Plan:   68 yo F with PMHx HTN, T2DM admitted for episodes of dyspnea and weakness on exertion. Cardiology consulted for 2:1 and 3:1 AV block, in Mobitz 1 as seen by occasional 3:2 groups seen on telemetry. EKGs at her last hospitalization also noted Mobitz 1 heart block. The etiology of her weakness episodes is unclear, and unlikely to be secondary to the 2:1 AV block as her HRs have remained in 50s and 60s and her symptoms are profound with even minimal activity. It is reasonable to consider an ischemic evaluation in this patient with risk factors and symptoms on exertion.    Problems:  1. Mobitz type 1 AV block - TSH wnl, lyme neg. Likely not driving sx.  2. HTN    Recommendations:  - f/up TTE  - exercise nuclear stress for ischemic eval, not a candidate for CCTA given renal fx  - Hold all AV karen blocking agents such as beta blockers    *** Recommendations are preliminary until cosigned by the attending.    Pancho Cavazos MD  Cardiology Fellow    For all New Consults and Questions:  [email or web address]   Login: [email or web address] Card[facility] Progress Note  ------------------------------------------------------------------------------------------    SUBJECTIVE/EVENTS::  - Tele: No events, 3:1 AV block c/w 2nd degree AV block type 1  - NAEO    -------------------------------------------------------------------------------------------  ROS:  CV: chest pain (-), palpitation (-), orthopnea (-), PND (-), edema (-)  PULM: SOB (+), cough (-), wheezing (-), hemoptysis (-).   CONST: fever (-), chills (-) or fatigue (-)  GI: abdominal distension (-), abdominal pain (-) , nausea/vomiting (-), hematemesis, (-), melena (-), hematochezia (-)  : dysuria (-), frequency (-), hematuria (-).   NEURO: numbness (-), weakness (-), dizziness (-)  MSK: myalgia (-), joint pain (-)   SKIN: itching (-), rash (-)  HEENT:  visual changes (-); vertigo or throat pain (-);  neck stiffness (-)   Psych: change in mood (-), anxiety (+), depression (-)     All other review of systems is negative unless indicated above.   -------------------------------------------------------------------------------------------  VS:  T(F): 97.4 (09-29), Max: 98.5 (09-28)  HR: 64 (09-29) (51 - 64)  BP: 145/80 (09-29) (128/75 - 155/79)  RR: 18 (09-29)  SpO2: 97% (09-29)  I&O's Summary    28 Sep 2023 07:01  -  29 Sep 2023 07:00  --------------------------------------------------------  IN: 480 mL / OUT: 1830 mL / NET: -1350 mL      ------------------------------------------------------------------------------------------  PHYSICAL EXAM:  GEN: NAD, sitting in bed  HEENT: NCAT, EOMI  CV: RRR, Ns1/s2, 3/6 systolic murmur, JVP not elevated  RESP: CTA B/l, no w/r/r  ABD: soft, nt, nd  Ext: warm, 2+ pulses, no edema  Neuro: AAOx3, no focal defcits    -------------------------------------------------------------------------------------------  LABS:                          10.2   6.38  )-----------( 180      ( 28 Sep 2023 15:04 )             31.9     09-28    139  |  106  |  44<H>  ----------------------------<  364<H>  4.2   |  20<L>  |  2.01<H>    Ca    9.6      28 Sep 2023 15:04        CARDIAC MARKERS ( 27 Sep 2023 07:22 )  28 ng/L / x     / x     / x     / x     / x      CARDIAC MARKERS ( 26 Sep 2023 21:45 )  28 ng/L / x     / x     / x     / x     / x        -------------------------------------------------------------------------------------------  Meds:  chlorhexidine 2% Cloths 1 Application(s) Topical <User Schedule>  dextrose 5%. 1000 milliLiter(s) IV Continuous <Continuous>  dextrose 5%. 1000 milliLiter(s) IV Continuous <Continuous>  dextrose 50% Injectable 12.5 Gram(s) IV Push once  dextrose 50% Injectable 25 Gram(s) IV Push once  dextrose 50% Injectable 25 Gram(s) IV Push once  dextrose Oral Gel 15 Gram(s) Oral once PRN  glucagon  Injectable 1 milliGRAM(s) IntraMuscular once  heparin   Injectable 5000 Unit(s) SubCutaneous three times a day  influenza  Vaccine (HIGH DOSE) 0.7 milliLiter(s) IntraMuscular once  insulin lispro (ADMELOG) corrective regimen sliding scale   SubCutaneous at bedtime  insulin lispro (ADMELOG) corrective regimen sliding scale   SubCutaneous three times a day before meals  lactated ringers. 500 milliLiter(s) IV Continuous <Continuous>    -------------------------------------------------------------------------------------------  Cardiovascular Diagnostic Testing:    Tele: 2:1 and 3:1 AV block, c/w Mobitz type 1    -------------------------------------------------------------------------------------------  Assessment and Plan:   66 yo F with PMHx HTN, T2DM admitted for episodes of dyspnea and weakness on exertion. Cardiology consulted for 2:1 and 3:1 AV block, in Mobitz 1 as seen by occasional 3:2 groups seen on telemetry. EKGs at her last hospitalization also noted Mobitz 1 heart block. The etiology of her weakness episodes is unclear, and unlikely to be secondary to the 2:1 AV block as her HRs have remained in 50s and 60s and her symptoms are profound with even minimal activity. It is reasonable to consider an ischemic evaluation in this patient with risk factors and symptoms on exertion.    Problems:  1. Mobitz type 1 AV block - TSH wnl, lyme neg. Likely not driving sx.  2. HTN    Recommendations:  - f/up TTE  - Pt unable to exercise and given Mobitz Type 1 block cannot get pharm nuclear test, will defer at this time as can get exercise stress as outpt  - Hold all AV karen blocking agents such as beta blockers    *** Recommendations are preliminary until cosigned by the attending.    Pancho Cavazos MD  Cardiology Fellow    For all New Consults and Questions:  [email or web address]   Login: [email or web address]

## 2023-09-30 LAB
A1C WITH ESTIMATED AVERAGE GLUCOSE RESULT: 8.6 % — HIGH (ref 4–5.6)
ANION GAP SERPL CALC-SCNC: 15 MMOL/L — SIGNIFICANT CHANGE UP (ref 5–17)
BUN SERPL-MCNC: 49 MG/DL — HIGH (ref 7–23)
CALCIUM SERPL-MCNC: 9.7 MG/DL — SIGNIFICANT CHANGE UP (ref 8.4–10.5)
CHLORIDE SERPL-SCNC: 104 MMOL/L — SIGNIFICANT CHANGE UP (ref 96–108)
CO2 SERPL-SCNC: 19 MMOL/L — LOW (ref 22–31)
CREAT SERPL-MCNC: 2 MG/DL — HIGH (ref 0.5–1.3)
EGFR: 27 ML/MIN/1.73M2 — LOW
ESTIMATED AVERAGE GLUCOSE: 200 MG/DL — HIGH (ref 68–114)
GLUCOSE BLDC GLUCOMTR-MCNC: 228 MG/DL — HIGH (ref 70–99)
GLUCOSE BLDC GLUCOMTR-MCNC: 244 MG/DL — HIGH (ref 70–99)
GLUCOSE BLDC GLUCOMTR-MCNC: 279 MG/DL — HIGH (ref 70–99)
GLUCOSE BLDC GLUCOMTR-MCNC: 362 MG/DL — HIGH (ref 70–99)
GLUCOSE SERPL-MCNC: 241 MG/DL — HIGH (ref 70–99)
POTASSIUM SERPL-MCNC: 4.4 MMOL/L — SIGNIFICANT CHANGE UP (ref 3.5–5.3)
POTASSIUM SERPL-SCNC: 4.4 MMOL/L — SIGNIFICANT CHANGE UP (ref 3.5–5.3)
SODIUM SERPL-SCNC: 138 MMOL/L — SIGNIFICANT CHANGE UP (ref 135–145)

## 2023-09-30 PROCEDURE — 99233 SBSQ HOSP IP/OBS HIGH 50: CPT | Mod: GC

## 2023-09-30 PROCEDURE — 99232 SBSQ HOSP IP/OBS MODERATE 35: CPT

## 2023-09-30 RX ADMIN — CHLORHEXIDINE GLUCONATE 1 APPLICATION(S): 213 SOLUTION TOPICAL at 05:40

## 2023-09-30 RX ADMIN — Medication 3: at 21:45

## 2023-09-30 RX ADMIN — HEPARIN SODIUM 5000 UNIT(S): 5000 INJECTION INTRAVENOUS; SUBCUTANEOUS at 21:44

## 2023-09-30 RX ADMIN — Medication 2: at 17:32

## 2023-09-30 RX ADMIN — HEPARIN SODIUM 5000 UNIT(S): 5000 INJECTION INTRAVENOUS; SUBCUTANEOUS at 13:55

## 2023-09-30 RX ADMIN — HEPARIN SODIUM 5000 UNIT(S): 5000 INJECTION INTRAVENOUS; SUBCUTANEOUS at 05:37

## 2023-09-30 RX ADMIN — Medication 2: at 08:07

## 2023-09-30 RX ADMIN — Medication 3: at 11:58

## 2023-09-30 NOTE — PROGRESS NOTE ADULT - ATTENDING COMMENTS
patient with vague symptoms of fatigue and weakness and transient dyspnea - unclear if related to cardiac etiology.   follow up echocardiogram
#jordi  atn+ cardiorenal  cr stable/plateued   received lasix 9/26, 28th  monitor creatinine trend  #hypervolemia  lasix as needed  #met acidosis  monitor bicarb trend

## 2023-09-30 NOTE — PROGRESS NOTE ADULT - SUBJECTIVE AND OBJECTIVE BOX
[facility] Division of Kidney Diseases & Hypertension  FOLLOW UP NOTE  [phone]--------------------------------------------------------------------------------  Chief Complaint:Dyspnea        24 hour events/subjective:        PAST HISTORY  --------------------------------------------------------------------------------  No significant changes to PMH, PSH, FHx, SHx, unless otherwise noted    ALLERGIES & MEDICATIONS  --------------------------------------------------------------------------------  Allergies    No Known Allergies    Intolerances      Standing Inpatient Medications  chlorhexidine 2% Cloths 1 Application(s) Topical <User Schedule>  dextrose 5%. 1000 milliLiter(s) IV Continuous <Continuous>  dextrose 5%. 1000 milliLiter(s) IV Continuous <Continuous>  dextrose 50% Injectable 12.5 Gram(s) IV Push once  dextrose 50% Injectable 25 Gram(s) IV Push once  dextrose 50% Injectable 25 Gram(s) IV Push once  glucagon  Injectable 1 milliGRAM(s) IntraMuscular once  heparin   Injectable 5000 Unit(s) SubCutaneous three times a day  influenza  Vaccine (HIGH DOSE) 0.7 milliLiter(s) IntraMuscular once  insulin lispro (ADMELOG) corrective regimen sliding scale   SubCutaneous at bedtime  insulin lispro (ADMELOG) corrective regimen sliding scale   SubCutaneous three times a day before meals  lactated ringers. 500 milliLiter(s) IV Continuous <Continuous>    PRN Inpatient Medications  dextrose Oral Gel 15 Gram(s) Oral once PRN      REVIEW OF SYSTEMS  --------------------------------------------------------------------------------  Not obtainable due to mental status.     VITALS/PHYSICAL EXAM  --------------------------------------------------------------------------------  T(C): 36.7 (09-30-23 @ 11:07), Max: 36.8 (09-29-23 @ 20:06)  HR: 58 (09-30-23 @ 11:07) (56 - 73)  BP: 148/77 (09-30-23 @ 11:07) (120/60 - 155/80)  RR: 18 (09-30-23 @ 11:07) (18 - 18)  SpO2: 96% (09-30-23 @ 11:07) (95% - 96%)  Wt(kg): --        09-29-23 @ 07:01  -  09-30-23 @ 07:00  --------------------------------------------------------  IN: 760 mL / OUT: 2000 mL / NET: -1240 mL    09-30-23 @ 07:01  -  09-30-23 @ 12:56  --------------------------------------------------------  IN: 320 mL / OUT: 0 mL / NET: 320 mL      Physical Exam:  General: no acute distress  Neuro: no focal deficits  HEENT: anicteric, +JVD  Pulmonary: Coarse breath sounds. Medi port on the rt ant. hemithorax.   Cardiovascular/Chest: +S1S2, Pt has murmurs at apex, in aortic area and Pulm area.   GI/Abdomen: soft, non-distended, non-tender, +bowel sounds  Extremities: bilateral mild LE pitting edema  Skin: Warm and dry      	  LABS/STUDIES  --------------------------------------------------------------------------------              10.2   6.93  >-----------<  180      [09-29-23 @ 10:31]              31.7     138  |  104  |  49  ----------------------------<  241      [09-30-23 @ 07:02]  4.4   |  19  |  2.00        Ca     9.7     [09-30-23 @ 07:02]            Creatinine Trend:  SCr 2.00 [09-30 @ 07:02]  SCr 1.96 [09-29 @ 10:30]  SCr 2.01 [09-28 @ 15:04]  SCr 2.01 [09-28 @ 07:24]  SCr 1.75 [09-27 @ 07:22]    Urinalysis - [09-30-23 @ 07:02]      Color  / Appearance  / SG  / pH       Gluc 241 / Ketone   / Bili  / Urobili        Blood  / Protein  / Leuk Est  / Nitrite       RBC  / WBC  / Hyaline  / Gran  / Sq Epi  / Non Sq Epi  / Bacteria     Urine Creatinine 110      [09-28-23 @ 17:35]  Urine Protein 14      [09-28-23 @ 17:35]  Urine Sodium 55      [09-28-23 @ 17:35]  Urine Potassium 27      [09-28-23 @ 17:35]  Urine Chloride 37      [09-28-23 @ 17:35]    TSH 3.17      [09-28-23 @ 07:24]    HCV 0.10, Nonreact      [09-28-23 @ 07:24]     [facility] Division of Kidney Diseases & Hypertension  FOLLOW UP NOTE  [phone]--------------------------------------------------------------------------------  Chief Complaint:Dyspnea        24 hour events/subjective:  sob stable, slightly better  no acute events      PAST HISTORY  --------------------------------------------------------------------------------  No significant changes to PMH, PSH, FHx, SHx, unless otherwise noted    ALLERGIES & MEDICATIONS  --------------------------------------------------------------------------------  Allergies    No Known Allergies    Intolerances      Standing Inpatient Medications  chlorhexidine 2% Cloths 1 Application(s) Topical <User Schedule>  dextrose 5%. 1000 milliLiter(s) IV Continuous <Continuous>  dextrose 5%. 1000 milliLiter(s) IV Continuous <Continuous>  dextrose 50% Injectable 12.5 Gram(s) IV Push once  dextrose 50% Injectable 25 Gram(s) IV Push once  dextrose 50% Injectable 25 Gram(s) IV Push once  glucagon  Injectable 1 milliGRAM(s) IntraMuscular once  heparin   Injectable 5000 Unit(s) SubCutaneous three times a day  influenza  Vaccine (HIGH DOSE) 0.7 milliLiter(s) IntraMuscular once  insulin lispro (ADMELOG) corrective regimen sliding scale   SubCutaneous at bedtime  insulin lispro (ADMELOG) corrective regimen sliding scale   SubCutaneous three times a day before meals  lactated ringers. 500 milliLiter(s) IV Continuous <Continuous>    PRN Inpatient Medications  dextrose Oral Gel 15 Gram(s) Oral once PRN      REVIEW OF SYSTEMS  --------------------------------------------------------------------------------  Not obtainable due to mental status.     VITALS/PHYSICAL EXAM  --------------------------------------------------------------------------------  T(C): 36.7 (09-30-23 @ 11:07), Max: 36.8 (09-29-23 @ 20:06)  HR: 58 (09-30-23 @ 11:07) (56 - 73)  BP: 148/77 (09-30-23 @ 11:07) (120/60 - 155/80)  RR: 18 (09-30-23 @ 11:07) (18 - 18)  SpO2: 96% (09-30-23 @ 11:07) (95% - 96%)  Wt(kg): --        09-29-23 @ 07:01  -  09-30-23 @ 07:00  --------------------------------------------------------  IN: 760 mL / OUT: 2000 mL / NET: -1240 mL    09-30-23 @ 07:01  -  09-30-23 @ 12:56  --------------------------------------------------------  IN: 320 mL / OUT: 0 mL / NET: 320 mL      Physical Exam:  General: no acute distress  Neuro: no focal deficits  HEENT: anicteric, +JVD  Pulmonary: Coarse breath sounds. Medi port on the rt ant. hemithorax.   Cardiovascular/Chest: +S1S2, Pt has murmurs at apex, in aortic area and Pulm area.   GI/Abdomen: soft, non-distended, non-tender, +bowel sounds  Extremities: bilateral mild LE pitting edema  Skin: Warm and dry      	  LABS/STUDIES  --------------------------------------------------------------------------------              10.2   6.93  >-----------<  180      [09-29-23 @ 10:31]              31.7     138  |  104  |  49  ----------------------------<  241      [09-30-23 @ 07:02]  4.4   |  19  |  2.00        Ca     9.7     [09-30-23 @ 07:02]            Creatinine Trend:  SCr 2.00 [09-30 @ 07:02]  SCr 1.96 [09-29 @ 10:30]  SCr 2.01 [09-28 @ 15:04]  SCr 2.01 [09-28 @ 07:24]  SCr 1.75 [09-27 @ 07:22]    Urinalysis - [09-30-23 @ 07:02]      Color  / Appearance  / SG  / pH       Gluc 241 / Ketone   / Bili  / Urobili        Blood  / Protein  / Leuk Est  / Nitrite       RBC  / WBC  / Hyaline  / Gran  / Sq Epi  / Non Sq Epi  / Bacteria     Urine Creatinine 110      [09-28-23 @ 17:35]  Urine Protein 14      [09-28-23 @ 17:35]  Urine Sodium 55      [09-28-23 @ 17:35]  Urine Potassium 27      [09-28-23 @ 17:35]  Urine Chloride 37      [09-28-23 @ 17:35]    TSH 3.17      [09-28-23 @ 07:24]    HCV 0.10, Nonreact      [09-28-23 @ 07:24]

## 2023-09-30 NOTE — PROGRESS NOTE ADULT - SUBJECTIVE AND OBJECTIVE BOX
[facility] Division of Hospital Medicine  Angle Donaldson MD  Available via MS Teams    SUBJECTIVE / OVERNIGHT EVENTS: No acute events overnight. Patient feeling well overall. Feeling tired but denies any chest pain or SOB. Denies any other concerns or complaints at this time.     Review of Systems:   CONSTITUTIONAL: No fever   EYES: No eye pain, visual disturbances, or discharge  ENMT: No difficulty hearing   RESPIRATORY: No SOB. No cough   CARDIOVASCULAR: No chest pain   GASTROINTESTINAL: No abdominal or epigastric pain. No nausea, vomiting, or hematemesis; No diarrhea   GENITOURINARY: No dysuria   NEUROLOGICAL: No headache   SKIN: No itching    MUSCULOSKELETAL: No joint pain or swelling; No muscle or back pain  PSYCHIATRIC: No depression or anxiety   HEME/LYMPH: No easy bruising or bleeding gums      MEDICATIONS  (STANDING):  chlorhexidine 2% Cloths 1 Application(s) Topical <User Schedule>  dextrose 5%. 1000 milliLiter(s) (50 mL/Hr) IV Continuous <Continuous>  dextrose 5%. 1000 milliLiter(s) (100 mL/Hr) IV Continuous <Continuous>  dextrose 50% Injectable 12.5 Gram(s) IV Push once  dextrose 50% Injectable 25 Gram(s) IV Push once  dextrose 50% Injectable 25 Gram(s) IV Push once  glucagon  Injectable 1 milliGRAM(s) IntraMuscular once  heparin   Injectable 5000 Unit(s) SubCutaneous three times a day  influenza  Vaccine (HIGH DOSE) 0.7 milliLiter(s) IntraMuscular once  insulin lispro (ADMELOG) corrective regimen sliding scale   SubCutaneous at bedtime  insulin lispro (ADMELOG) corrective regimen sliding scale   SubCutaneous three times a day before meals  lactated ringers. 500 milliLiter(s) (100 mL/Hr) IV Continuous <Continuous>    MEDICATIONS  (PRN):  dextrose Oral Gel 15 Gram(s) Oral once PRN Blood Glucose LESS THAN 70 milliGRAM(s)/deciliter      I&O's Summary    29 Sep 2023 07:01  -  30 Sep 2023 07:00  --------------------------------------------------------  IN: 760 mL / OUT: 2000 mL / NET: -1240 mL    30 Sep 2023 07:01  -  30 Sep 2023 13:56  --------------------------------------------------------  IN: 320 mL / OUT: 0 mL / NET: 320 mL      PHYSICAL EXAM:  Vital Signs Last 24 Hrs  T(C): 36.7 (30 Sep 2023 11:07), Max: 36.8 (29 Sep 2023 20:06)  T(F): 98 (30 Sep 2023 11:07), Max: 98.2 (29 Sep 2023 20:06)  HR: 58 (30 Sep 2023 11:07) (56 - 73)  BP: 148/77 (30 Sep 2023 11:07) (120/60 - 155/80)  RR: 18 (30 Sep 2023 11:07) (18 - 18)  SpO2: 96% (30 Sep 2023 11:07) (95% - 96%)    Parameters below as of 30 Sep 2023 11:07  Patient On (Oxygen Delivery Method): room air      CONSTITUTIONAL: NAD, well-developed   EYES: PERRLA; conjunctiva and sclera clear  ENMT: Moist oral mucosa, no pharyngeal injection or exudates   NECK: Supple   RESPIRATORY: Normal respiratory effort; lungs are clear to auscultation bilaterally  CARDIOVASCULAR: Regular rate and rhythm, normal S1 and S2, no murmur   ABDOMEN: Nontender to palpation, normoactive bowel sounds   MUSCULOSKELETAL: no clubbing or cyanosis of digits; no joint swelling or tenderness to palpation  PSYCH: A+O to person, place, and time; affect appropriate  NEUROLOGY: no gross sensory deficits   SKIN: No rashes     LABS:                        10.2   6.93  )-----------( 180      ( 29 Sep 2023 10:31 )             31.7     09-30    138  |  104  |  49<H>  ----------------------------<  241<H>  4.4   |  19<L>  |  2.00<H>    Ca    9.7      30 Sep 2023 07:02      Urinalysis Basic - ( 30 Sep 2023 07:02 )    Color: x / Appearance: x / SG: x / pH: x  Gluc: 241 mg/dL / Ketone: x  / Bili: x / Urobili: x   Blood: x / Protein: x / Nitrite: x   Leuk Esterase: x / RBC: x / WBC x   Sq Epi: x / Non Sq Epi: x / Bacteria: x        COVID-19 PCR: NotDetec (27 Sep 2023 17:59)  SARS-CoV-2: NotDetec (26 Sep 2023 21:44)      RADIOLOGY & ADDITIONAL TESTS:  New Imaging Personally Reviewed Today:  < from: CT Chest No Cont (09.29.23 @ 19:26) >  IMPRESSION:    Clear lungs. No pleural effusion.    < end of copied text >    New Electrocardiogram Personally Reviewed Today:  Other Results Reviewed Today:   Prior or Outpatient Records Reviewed Today with Summary:    COORDINATION OF CARE:  Consultant Communication and Details of Discussion (where applicable):

## 2023-10-01 LAB
ANION GAP SERPL CALC-SCNC: 11 MMOL/L — SIGNIFICANT CHANGE UP (ref 5–17)
BUN SERPL-MCNC: 43 MG/DL — HIGH (ref 7–23)
CALCIUM SERPL-MCNC: 9.3 MG/DL — SIGNIFICANT CHANGE UP (ref 8.4–10.5)
CHLORIDE SERPL-SCNC: 106 MMOL/L — SIGNIFICANT CHANGE UP (ref 96–108)
CO2 SERPL-SCNC: 18 MMOL/L — LOW (ref 22–31)
CREAT SERPL-MCNC: 1.94 MG/DL — HIGH (ref 0.5–1.3)
EGFR: 28 ML/MIN/1.73M2 — LOW
GLUCOSE BLDC GLUCOMTR-MCNC: 264 MG/DL — HIGH (ref 70–99)
GLUCOSE BLDC GLUCOMTR-MCNC: 303 MG/DL — HIGH (ref 70–99)
GLUCOSE BLDC GLUCOMTR-MCNC: 380 MG/DL — HIGH (ref 70–99)
GLUCOSE BLDC GLUCOMTR-MCNC: 384 MG/DL — HIGH (ref 70–99)
GLUCOSE BLDC GLUCOMTR-MCNC: 410 MG/DL — HIGH (ref 70–99)
GLUCOSE SERPL-MCNC: 241 MG/DL — HIGH (ref 70–99)
HCT VFR BLD CALC: 30.8 % — LOW (ref 34.5–45)
HGB BLD-MCNC: 9.8 G/DL — LOW (ref 11.5–15.5)
MAGNESIUM SERPL-MCNC: 1.8 MG/DL — SIGNIFICANT CHANGE UP (ref 1.6–2.6)
MCHC RBC-ENTMCNC: 27 PG — SIGNIFICANT CHANGE UP (ref 27–34)
MCHC RBC-ENTMCNC: 31.8 GM/DL — LOW (ref 32–36)
MCV RBC AUTO: 84.8 FL — SIGNIFICANT CHANGE UP (ref 80–100)
NRBC # BLD: 0 /100 WBCS — SIGNIFICANT CHANGE UP (ref 0–0)
PHOSPHATE SERPL-MCNC: 3.8 MG/DL — SIGNIFICANT CHANGE UP (ref 2.5–4.5)
PLATELET # BLD AUTO: 165 K/UL — SIGNIFICANT CHANGE UP (ref 150–400)
POTASSIUM SERPL-MCNC: 4.5 MMOL/L — SIGNIFICANT CHANGE UP (ref 3.5–5.3)
POTASSIUM SERPL-SCNC: 4.5 MMOL/L — SIGNIFICANT CHANGE UP (ref 3.5–5.3)
RBC # BLD: 3.63 M/UL — LOW (ref 3.8–5.2)
RBC # FLD: 13.9 % — SIGNIFICANT CHANGE UP (ref 10.3–14.5)
SODIUM SERPL-SCNC: 135 MMOL/L — SIGNIFICANT CHANGE UP (ref 135–145)
WBC # BLD: 5.35 K/UL — SIGNIFICANT CHANGE UP (ref 3.8–10.5)
WBC # FLD AUTO: 5.35 K/UL — SIGNIFICANT CHANGE UP (ref 3.8–10.5)

## 2023-10-01 PROCEDURE — 99232 SBSQ HOSP IP/OBS MODERATE 35: CPT

## 2023-10-01 RX ADMIN — HEPARIN SODIUM 5000 UNIT(S): 5000 INJECTION INTRAVENOUS; SUBCUTANEOUS at 13:40

## 2023-10-01 RX ADMIN — CHLORHEXIDINE GLUCONATE 1 APPLICATION(S): 213 SOLUTION TOPICAL at 05:19

## 2023-10-01 RX ADMIN — HEPARIN SODIUM 5000 UNIT(S): 5000 INJECTION INTRAVENOUS; SUBCUTANEOUS at 05:19

## 2023-10-01 RX ADMIN — Medication 4: at 17:44

## 2023-10-01 RX ADMIN — Medication 3: at 08:02

## 2023-10-01 RX ADMIN — HEPARIN SODIUM 5000 UNIT(S): 5000 INJECTION INTRAVENOUS; SUBCUTANEOUS at 22:33

## 2023-10-01 RX ADMIN — Medication 3: at 21:43

## 2023-10-01 RX ADMIN — Medication 5: at 11:49

## 2023-10-01 NOTE — PROGRESS NOTE ADULT - SUBJECTIVE AND OBJECTIVE BOX
[facility] Division of Hospital Medicine  Angle Donaldson MD  Available via MS Teams    SUBJECTIVE / OVERNIGHT EVENTS: No acute events overnight. Patient still feeling very weak. Denies any chest pain or SOB. Denies any other concerns or complaints at this time.      Review of Systems:   CONSTITUTIONAL: No fever   EYES: No eye pain, visual disturbances, or discharge  ENMT: No difficulty hearing   RESPIRATORY: No SOB. No cough   CARDIOVASCULAR: No chest pain   GASTROINTESTINAL: No abdominal or epigastric pain. No nausea, vomiting, or hematemesis; No diarrhea   GENITOURINARY: No dysuria   NEUROLOGICAL: No headaches   SKIN: No itching    MUSCULOSKELETAL: No joint pain or swelling; No muscle or back pain  PSYCHIATRIC: No depression or anxiety  HEME/LYMPH: No easy bruising or bleeding gums    MEDICATIONS  (STANDING):  chlorhexidine 2% Cloths 1 Application(s) Topical <User Schedule>  dextrose 5%. 1000 milliLiter(s) (50 mL/Hr) IV Continuous <Continuous>  dextrose 5%. 1000 milliLiter(s) (100 mL/Hr) IV Continuous <Continuous>  dextrose 50% Injectable 12.5 Gram(s) IV Push once  dextrose 50% Injectable 25 Gram(s) IV Push once  dextrose 50% Injectable 25 Gram(s) IV Push once  glucagon  Injectable 1 milliGRAM(s) IntraMuscular once  heparin   Injectable 5000 Unit(s) SubCutaneous three times a day  influenza  Vaccine (HIGH DOSE) 0.7 milliLiter(s) IntraMuscular once  insulin lispro (ADMELOG) corrective regimen sliding scale   SubCutaneous at bedtime  insulin lispro (ADMELOG) corrective regimen sliding scale   SubCutaneous three times a day before meals  lactated ringers. 500 milliLiter(s) (100 mL/Hr) IV Continuous <Continuous>    MEDICATIONS  (PRN):  dextrose Oral Gel 15 Gram(s) Oral once PRN Blood Glucose LESS THAN 70 milliGRAM(s)/deciliter      I&O's Summary    30 Sep 2023 07:01  -  01 Oct 2023 07:00  --------------------------------------------------------  IN: 870 mL / OUT: 1350 mL / NET: -480 mL    01 Oct 2023 07:01  -  01 Oct 2023 14:09  --------------------------------------------------------  IN: 630 mL / OUT: 300 mL / NET: 330 mL        PHYSICAL EXAM:  Vital Signs Last 24 Hrs  T(C): 36.7 (01 Oct 2023 12:02), Max: 37.1 (01 Oct 2023 04:00)  T(F): 98 (01 Oct 2023 12:02), Max: 98.8 (01 Oct 2023 04:00)  HR: 69 (01 Oct 2023 12:02) (56 - 69)  BP: 121/78 (01 Oct 2023 12:02) (120/69 - 150/66)  RR: 18 (01 Oct 2023 12:02) (18 - 18)  SpO2: 95% (01 Oct 2023 12:02) (94% - 95%)    Parameters below as of 01 Oct 2023 12:02  Patient On (Oxygen Delivery Method): room air      CONSTITUTIONAL: distress due to feeling weak, well-developed   EYES: PERRLA; conjunctiva and sclera clear  ENMT: Moist oral mucosa, no pharyngeal injection or exudates   NECK: Supple   RESPIRATORY: Normal respiratory effort; lungs are clear to auscultation bilaterally  CARDIOVASCULAR: Regular rate and rhythm, normal S1 and S2, no murmur   ABDOMEN: Nontender to palpation, normoactive bowel sounds   MUSCULOSKELETAL: no clubbing or cyanosis of digits; no joint swelling or tenderness to palpation  PSYCH: A+O to person, place, and time; affect appropriate  NEUROLOGY: no gross sensory deficits   SKIN: No rashes     LABS:                        9.8    5.35  )-----------( 165      ( 01 Oct 2023 06:04 )             30.8     10-01    135  |  106  |  43<H>  ----------------------------<  241<H>  4.5   |  18<L>  |  1.94<H>    Ca    9.3      01 Oct 2023 06:04  Phos  3.8     10-01  Mg     1.8     10-01      Urinalysis Basic - ( 01 Oct 2023 06:04 )    Color: x / Appearance: x / SG: x / pH: x  Gluc: 241 mg/dL / Ketone: x  / Bili: x / Urobili: x   Blood: x / Protein: x / Nitrite: x   Leuk Esterase: x / RBC: x / WBC x   Sq Epi: x / Non Sq Epi: x / Bacteria: x        COVID-19 PCR: NotDetec (27 Sep 2023 17:59)  SARS-CoV-2: NotDetec (26 Sep 2023 21:44)      RADIOLOGY & ADDITIONAL TESTS:  New Imaging Personally Reviewed Today:  New Electrocardiogram Personally Reviewed Today:  Other Results Reviewed Today:   Prior or Outpatient Records Reviewed Today with Summary:    COORDINATION OF CARE:  Consultant Communication and Details of Discussion (where applicable):

## 2023-10-02 DIAGNOSIS — U07.1 COVID-19: ICD-10-CM

## 2023-10-02 LAB
ANION GAP SERPL CALC-SCNC: 10 MMOL/L — SIGNIFICANT CHANGE UP (ref 5–17)
BUN SERPL-MCNC: 52 MG/DL — HIGH (ref 7–23)
CALCIUM SERPL-MCNC: 9.3 MG/DL — SIGNIFICANT CHANGE UP (ref 8.4–10.5)
CHLORIDE SERPL-SCNC: 104 MMOL/L — SIGNIFICANT CHANGE UP (ref 96–108)
CO2 SERPL-SCNC: 19 MMOL/L — LOW (ref 22–31)
CREAT SERPL-MCNC: 1.93 MG/DL — HIGH (ref 0.5–1.3)
CRP SERPL-MCNC: 24 MG/L — HIGH (ref 0–4)
D DIMER BLD IA.RAPID-MCNC: 161 NG/ML DDU — SIGNIFICANT CHANGE UP
EGFR: 28 ML/MIN/1.73M2 — LOW
FERRITIN SERPL-MCNC: 177 NG/ML — SIGNIFICANT CHANGE UP (ref 13–330)
GLUCOSE BLDC GLUCOMTR-MCNC: 247 MG/DL — HIGH (ref 70–99)
GLUCOSE BLDC GLUCOMTR-MCNC: 248 MG/DL — HIGH (ref 70–99)
GLUCOSE BLDC GLUCOMTR-MCNC: 319 MG/DL — HIGH (ref 70–99)
GLUCOSE BLDC GLUCOMTR-MCNC: 322 MG/DL — HIGH (ref 70–99)
GLUCOSE SERPL-MCNC: 355 MG/DL — HIGH (ref 70–99)
HCT VFR BLD CALC: 30.8 % — LOW (ref 34.5–45)
HGB BLD-MCNC: 9.8 G/DL — LOW (ref 11.5–15.5)
LDH SERPL L TO P-CCNC: 192 U/L — SIGNIFICANT CHANGE UP (ref 50–242)
MAGNESIUM SERPL-MCNC: 1.8 MG/DL — SIGNIFICANT CHANGE UP (ref 1.6–2.6)
MCHC RBC-ENTMCNC: 26.9 PG — LOW (ref 27–34)
MCHC RBC-ENTMCNC: 31.8 GM/DL — LOW (ref 32–36)
MCV RBC AUTO: 84.6 FL — SIGNIFICANT CHANGE UP (ref 80–100)
NRBC # BLD: 0 /100 WBCS — SIGNIFICANT CHANGE UP (ref 0–0)
PHOSPHATE SERPL-MCNC: 3.8 MG/DL — SIGNIFICANT CHANGE UP (ref 2.5–4.5)
PLATELET # BLD AUTO: 153 K/UL — SIGNIFICANT CHANGE UP (ref 150–400)
POTASSIUM SERPL-MCNC: 4.3 MMOL/L — SIGNIFICANT CHANGE UP (ref 3.5–5.3)
POTASSIUM SERPL-SCNC: 4.3 MMOL/L — SIGNIFICANT CHANGE UP (ref 3.5–5.3)
PROCALCITONIN SERPL-MCNC: 0.12 NG/ML — HIGH (ref 0.02–0.1)
RAPID RVP RESULT: DETECTED
RBC # BLD: 3.64 M/UL — LOW (ref 3.8–5.2)
RBC # FLD: 13.8 % — SIGNIFICANT CHANGE UP (ref 10.3–14.5)
SARS-COV-2 RNA SPEC QL NAA+PROBE: DETECTED
SODIUM SERPL-SCNC: 133 MMOL/L — LOW (ref 135–145)
WBC # BLD: 4.8 K/UL — SIGNIFICANT CHANGE UP (ref 3.8–10.5)
WBC # FLD AUTO: 4.8 K/UL — SIGNIFICANT CHANGE UP (ref 3.8–10.5)

## 2023-10-02 PROCEDURE — 93306 TTE W/DOPPLER COMPLETE: CPT | Mod: 26

## 2023-10-02 PROCEDURE — 99233 SBSQ HOSP IP/OBS HIGH 50: CPT

## 2023-10-02 PROCEDURE — 99231 SBSQ HOSP IP/OBS SF/LOW 25: CPT | Mod: GC

## 2023-10-02 PROCEDURE — 71045 X-RAY EXAM CHEST 1 VIEW: CPT | Mod: 26

## 2023-10-02 PROCEDURE — 99233 SBSQ HOSP IP/OBS HIGH 50: CPT | Mod: GC

## 2023-10-02 RX ORDER — BENZOCAINE AND MENTHOL 5; 1 G/100ML; G/100ML
1 LIQUID ORAL EVERY 4 HOURS
Refills: 0 | Status: DISCONTINUED | OUTPATIENT
Start: 2023-10-02 | End: 2023-10-05

## 2023-10-02 RX ORDER — DISOPYRAMIDE PHOSPHATE 100 MG
100 CAPSULE ORAL EVERY 8 HOURS
Refills: 0 | Status: DISCONTINUED | OUTPATIENT
Start: 2023-10-02 | End: 2023-10-02

## 2023-10-02 RX ORDER — DISOPYRAMIDE PHOSPHATE 100 MG
100 CAPSULE ORAL EVERY 8 HOURS
Refills: 0 | Status: DISCONTINUED | OUTPATIENT
Start: 2023-10-02 | End: 2023-10-04

## 2023-10-02 RX ORDER — INSULIN GLARGINE 100 [IU]/ML
6 INJECTION, SOLUTION SUBCUTANEOUS AT BEDTIME
Refills: 0 | Status: DISCONTINUED | OUTPATIENT
Start: 2023-10-02 | End: 2023-10-04

## 2023-10-02 RX ORDER — INSULIN LISPRO 100/ML
2 VIAL (ML) SUBCUTANEOUS
Refills: 0 | Status: DISCONTINUED | OUTPATIENT
Start: 2023-10-02 | End: 2023-10-04

## 2023-10-02 RX ADMIN — HEPARIN SODIUM 5000 UNIT(S): 5000 INJECTION INTRAVENOUS; SUBCUTANEOUS at 05:07

## 2023-10-02 RX ADMIN — HEPARIN SODIUM 5000 UNIT(S): 5000 INJECTION INTRAVENOUS; SUBCUTANEOUS at 21:51

## 2023-10-02 RX ADMIN — Medication 100 MILLIGRAM(S): at 13:53

## 2023-10-02 RX ADMIN — INSULIN GLARGINE 6 UNIT(S): 100 INJECTION, SOLUTION SUBCUTANEOUS at 21:52

## 2023-10-02 RX ADMIN — Medication 4: at 07:50

## 2023-10-02 RX ADMIN — Medication 100 MILLIGRAM(S): at 21:52

## 2023-10-02 RX ADMIN — CHLORHEXIDINE GLUCONATE 1 APPLICATION(S): 213 SOLUTION TOPICAL at 06:35

## 2023-10-02 RX ADMIN — Medication 2 UNIT(S): at 11:47

## 2023-10-02 RX ADMIN — BENZOCAINE AND MENTHOL 1 LOZENGE: 5; 1 LIQUID ORAL at 05:07

## 2023-10-02 RX ADMIN — Medication 4: at 11:47

## 2023-10-02 RX ADMIN — Medication 2 UNIT(S): at 17:45

## 2023-10-02 RX ADMIN — Medication 100 MILLIGRAM(S): at 22:46

## 2023-10-02 RX ADMIN — Medication 100 MILLIGRAM(S): at 21:51

## 2023-10-02 RX ADMIN — BENZOCAINE AND MENTHOL 1 LOZENGE: 5; 1 LIQUID ORAL at 00:21

## 2023-10-02 RX ADMIN — Medication 2: at 17:45

## 2023-10-02 RX ADMIN — HEPARIN SODIUM 5000 UNIT(S): 5000 INJECTION INTRAVENOUS; SUBCUTANEOUS at 13:53

## 2023-10-02 NOTE — PROGRESS NOTE ADULT - SUBJECTIVE AND OBJECTIVE BOX
Charlie Torres M.D.  Division of Hospital Medicine  Available on [employer] TEAMS    SUBJECTIVE / ACUTE INTERVAL EVENTS: Patient seen and examined. Overnight patient complained of persisting, new cough, productive of clear sputum; patient remained afebrile. Ordered RVP STAT this AM which returned positive for COVID-19; baseline inflammatory markers ordered STAT along w/ CXR; antitussives ordered; patient is not hypoxic, but will continue close monitoring. She denies SOB/CAGE, CP, palpitations, or calf tenderness. Will update patient's family who were visiting the patient overnight.      OBJECTIVE:   Vital Signs Last 24 Hrs  T(F): 98 (02 Oct 2023 11:24), Max: 99 (02 Oct 2023 00:00)  HR: 92 (02 Oct 2023 11:24) (61 - 92)  BP: 154/66 (02 Oct 2023 11:24) (109/78 - 176/71)  RR: 18 (02 Oct 2023 11:24) (18 - 18)  SpO2: 95% (02 Oct 2023 11:24) (94% - 96%)  Parameters below as of 02 Oct 2023 11:24  Patient On (Oxygen Delivery Method): room air    I&O's Summary  01 Oct 2023 07:01  -  02 Oct 2023 07:00  --------------------------------------------------------  IN: 630 mL / OUT: 1200 mL / NET: -570 mL    Physical Examination:  GEN: middle aged woman, sitting up in bed in distress d/t cough  PSYCH: A&Ox3, mood and affect appear appropriate   NEURO: no focal neurologic deficits appreciated  RESPI: no accessory muscle use, B/L air entry, CTAB   CARDIO: regular rate/rhythm, no LE edema B/L  ABD: soft, NT, ND  EXT: patient able to move all extremities spontaneously  VASC: peripheral pulses palpated    Labs:  CAPILLARY BLOOD GLUCOSE  POCT Blood Glucose.: 322 mg/dL (02 Oct 2023 11:43)  POCT Blood Glucose.: 319 mg/dL (02 Oct 2023 07:35)  POCT Blood Glucose.: 380 mg/dL (01 Oct 2023 21:41)  POCT Blood Glucose.: 410 mg/dL (01 Oct 2023 21:28)  POCT Blood Glucose.: 303 mg/dL (01 Oct 2023 17:19)                      9.8    4.80  )-----------( 153      ( 02 Oct 2023 06:48 )             30.8     10-02  133<L>  |  104  |  52<H>  ----------------------------<  355<H>  4.3   |  19<L>  |  1.93<H>    Ca    9.3      02 Oct 2023 06:45  Phos  3.8     10-02  Mg     1.8     10-02    Urinalysis Basic - ( 02 Oct 2023 06:45 )  Color: x / Appearance: x / SG: x / pH: x  Gluc: 355 mg/dL / Ketone: x  / Bili: x / Urobili: x   Blood: x / Protein: x / Nitrite: x   Leuk Esterase: x / RBC: x / WBC x   Sq Epi: x / Non Sq Epi: x / Bacteria: x    Consultant(s) Notes Reviewed: Care Coordination  Care Discussed with Consultants/Other Providers: ASIYA Castro    MEDICATIONS  (STANDING):  benzonatate 100 milliGRAM(s) Oral three times a day  chlorhexidine 2% Cloths 1 Application(s) Topical <User Schedule>  dextrose 5%. 1000 milliLiter(s) (50 mL/Hr) IV Continuous <Continuous>  dextrose 5%. 1000 milliLiter(s) (100 mL/Hr) IV Continuous <Continuous>  dextrose 50% Injectable 25 Gram(s) IV Push once  dextrose 50% Injectable 12.5 Gram(s) IV Push once  dextrose 50% Injectable 25 Gram(s) IV Push once  glucagon  Injectable 1 milliGRAM(s) IntraMuscular once  heparin   Injectable 5000 Unit(s) SubCutaneous three times a day  influenza  Vaccine (HIGH DOSE) 0.7 milliLiter(s) IntraMuscular once  insulin glargine Injectable (LANTUS) 6 Unit(s) SubCutaneous at bedtime  insulin lispro (ADMELOG) corrective regimen sliding scale   SubCutaneous at bedtime  insulin lispro (ADMELOG) corrective regimen sliding scale   SubCutaneous three times a day before meals  insulin lispro Injectable (ADMELOG) 2 Unit(s) SubCutaneous three times a day before meals    MEDICATIONS  (PRN):  benzocaine/menthol Lozenge 1 Lozenge Oral every 4 hours PRN Sore Throat  dextrose Oral Gel 15 Gram(s) Oral once PRN Blood Glucose LESS THAN 70 milliGRAM(s)/deciliter  guaiFENesin Oral Liquid (Sugar-Free) 100 milliGRAM(s) Oral every 6 hours PRN Cough

## 2023-10-02 NOTE — PROGRESS NOTE ADULT - SUBJECTIVE AND OBJECTIVE BOX
Cardiology Progress Note  ------------------------------------------------------------------------------------------    SUBJECTIVE/EVENTS::  - Tele: mobitz Type 1  - NAEO    -------------------------------------------------------------------------------------------  ROS:  CV: chest pain (-), palpitation (-), orthopnea (-), PND (-), edema (-)  PULM: SOB (-), cough (-), wheezing (-), hemoptysis (-).   CONST: fever (-), chills (-) or fatigue (-)  GI: abdominal distension (-), abdominal pain (-) , nausea/vomiting (-), hematemesis, (-), melena (-), hematochezia (-)  : dysuria (-), frequency (-), hematuria (-).   NEURO: numbness (-), weakness (-), dizziness (-)  MSK: myalgia (-), joint pain (-)   SKIN: itching (-), rash (-)  HEENT:  visual changes (-); vertigo or throat pain (-);  neck stiffness (-)   Psych: change in mood (-), anxiety (-), depression (-)     All other review of systems is negative unless indicated above.   -------------------------------------------------------------------------------------------  VS:  T(F): 98 (10-02), Max: 99 (10-02)  HR: 92 (10-02) (61 - 92)  BP: 154/66 (10-02) (109/78 - 176/71)  RR: 18 (10-02)  SpO2: 95% (10-02)  I&O's Summary    01 Oct 2023 07:01  -  02 Oct 2023 07:00  --------------------------------------------------------  IN: 630 mL / OUT: 1200 mL / NET: -570 mL      ------------------------------------------------------------------------------------------  PHYSICAL EXAM:  GEN: NAD, sitting in bed  HEENT: NCAT, EOMI  CV: RRR, Ns1/s2, 3/6 systolic murmur, JVP not elevated  RESP: CTA B/l, no w/r/r  ABD: soft, nt, nd  Ext: warm, 2+ pulses, no edema  Neuro: AAOx3, no focal defcits    -------------------------------------------------------------------------------------------  LABS:                          9.8    4.80  )-----------( 153      ( 02 Oct 2023 06:48 )             30.8     10-02    133<L>  |  104  |  52<H>  ----------------------------<  355<H>  4.3   |  19<L>  |  1.93<H>    Ca    9.3      02 Oct 2023 06:45  Phos  3.8     10-02  Mg     1.8     10-02        CARDIAC MARKERS ( 27 Sep 2023 07:22 )  28 ng/L / x     / x     / x     / x     / x      CARDIAC MARKERS ( 26 Sep 2023 21:45 )  28 ng/L / x     / x     / x     / x     / x                -------------------------------------------------------------------------------------------  Meds:  benzocaine/menthol Lozenge 1 Lozenge Oral every 4 hours PRN  benzonatate 100 milliGRAM(s) Oral three times a day  chlorhexidine 2% Cloths 1 Application(s) Topical <User Schedule>  dextrose 5%. 1000 milliLiter(s) IV Continuous <Continuous>  dextrose 5%. 1000 milliLiter(s) IV Continuous <Continuous>  dextrose 50% Injectable 12.5 Gram(s) IV Push once  dextrose 50% Injectable 25 Gram(s) IV Push once  dextrose 50% Injectable 25 Gram(s) IV Push once  dextrose Oral Gel 15 Gram(s) Oral once PRN  glucagon  Injectable 1 milliGRAM(s) IntraMuscular once  guaiFENesin Oral Liquid (Sugar-Free) 100 milliGRAM(s) Oral every 6 hours PRN  heparin   Injectable 5000 Unit(s) SubCutaneous three times a day  influenza  Vaccine (HIGH DOSE) 0.7 milliLiter(s) IntraMuscular once  insulin glargine Injectable (LANTUS) 6 Unit(s) SubCutaneous at bedtime  insulin lispro (ADMELOG) corrective regimen sliding scale   SubCutaneous at bedtime  insulin lispro (ADMELOG) corrective regimen sliding scale   SubCutaneous three times a day before meals  insulin lispro Injectable (ADMELOG) 2 Unit(s) SubCutaneous three times a day before meals    -------------------------------------------------------------------------------------------  Cardiovascular Diagnostic Testing:  Tele: 2:1 and 3:1 AV block, c/w Mobitz type 1    TTE:      -------------------------------------------------------------------------------------------  Assessment and Plan:   68 yo F with PMHx HTN, T2DM admitted for episodes of dyspnea and weakness on exertion. Cardiology consulted for 2:1 and 3:1 AV block, in Mobitz 1 as seen by occasional 3:2 groups seen on telemetry. EKGs at her last hospitalization also noted Mobitz 1 heart block. The etiology of her weakness episodes is unclear, and unlikely to be secondary to the 2:1 AV block as her HRs have remained in 50s and 60s and her symptoms are profound with even minimal activity. It is reasonable to consider an ischemic evaluation in this patient with risk factors and symptoms on exertion. TTE w/ significant SRIKANTH which may explain sx of exertional fatigue/dyspnea.    Problems:  1. HOCM w/ SRIKANTH - noted on TTE from 10/2  1. Mobitz type 1 AV block - TSH wnl, lyme neg. Likely not driving sx. Found to have SRIKANTH on TTE.  2. HTN    Recommendations:  - TTE w/ significant SRIKANTH - please start disopyramide 100 TID, check QTC in 3d  - Pt unable to exercise and given Mobitz Type 1 block cannot get pharm nuclear test, will defer at this time as can get exercise stress as outpt  - hold all diuretics, ensure pt is well hydrated  - tele    *** Recommendations are preliminary until cosigned by the attending.    Pancho Cavazos MD  Cardiology Fellow    For all New Consults and Questions:  [email or web address]   Login: [email or web address] Progress Note  ------------------------------------------------------------------------------------------    SUBJECTIVE/EVENTS::  - Tele: mobitz Type 1  - NAEO    -------------------------------------------------------------------------------------------  ROS:  CV: chest pain (-), palpitation (-), orthopnea (-), PND (-), edema (-)  PULM: SOB (-), cough (-), wheezing (-), hemoptysis (-).   CONST: fever (-), chills (-) or fatigue (-)  GI: abdominal distension (-), abdominal pain (-) , nausea/vomiting (-), hematemesis, (-), melena (-), hematochezia (-)  : dysuria (-), frequency (-), hematuria (-).   NEURO: numbness (-), weakness (-), dizziness (-)  MSK: myalgia (-), joint pain (-)   SKIN: itching (-), rash (-)  HEENT:  visual changes (-); vertigo or throat pain (-);  neck stiffness (-)   Psych: change in mood (-), anxiety (-), depression (-)     All other review of systems is negative unless indicated above.   -------------------------------------------------------------------------------------------  VS:  T(F): 98 (10-02), Max: 99 (10-02)  HR: 92 (10-02) (61 - 92)  BP: 154/66 (10-02) (109/78 - 176/71)  RR: 18 (10-02)  SpO2: 95% (10-02)  I&O's Summary    01 Oct 2023 07:01  -  02 Oct 2023 07:00  --------------------------------------------------------  IN: 630 mL / OUT: 1200 mL / NET: -570 mL      ------------------------------------------------------------------------------------------  PHYSICAL EXAM:  GEN: NAD, sitting in bed  HEENT: NCAT, EOMI  CV: RRR, Ns1/s2, 3/6 systolic murmur, JVP not elevated  RESP: CTA B/l, no w/r/r  ABD: soft, nt, nd  Ext: warm, 2+ pulses, no edema  Neuro: AAOx3, no focal defcits    -------------------------------------------------------------------------------------------  LABS:                          9.8    4.80  )-----------( 153      ( 02 Oct 2023 06:48 )             30.8     10-02    133<L>  |  104  |  52<H>  ----------------------------<  355<H>  4.3   |  19<L>  |  1.93<H>    Ca    9.3      02 Oct 2023 06:45  Phos  3.8     10-02  Mg     1.8     10-02        CARDIAC MARKERS ( 27 Sep 2023 07:22 )  28 ng/L / x     / x     / x     / x     / x      CARDIAC MARKERS ( 26 Sep 2023 21:45 )  28 ng/L / x     / x     / x     / x     / x                -------------------------------------------------------------------------------------------  Meds:  benzocaine/menthol Lozenge 1 Lozenge Oral every 4 hours PRN  benzonatate 100 milliGRAM(s) Oral three times a day  chlorhexidine 2% Cloths 1 Application(s) Topical <User Schedule>  dextrose 5%. 1000 milliLiter(s) IV Continuous <Continuous>  dextrose 5%. 1000 milliLiter(s) IV Continuous <Continuous>  dextrose 50% Injectable 12.5 Gram(s) IV Push once  dextrose 50% Injectable 25 Gram(s) IV Push once  dextrose 50% Injectable 25 Gram(s) IV Push once  dextrose Oral Gel 15 Gram(s) Oral once PRN  glucagon  Injectable 1 milliGRAM(s) IntraMuscular once  guaiFENesin Oral Liquid (Sugar-Free) 100 milliGRAM(s) Oral every 6 hours PRN  heparin   Injectable 5000 Unit(s) SubCutaneous three times a day  influenza  Vaccine (HIGH DOSE) 0.7 milliLiter(s) IntraMuscular once  insulin glargine Injectable (LANTUS) 6 Unit(s) SubCutaneous at bedtime  insulin lispro (ADMELOG) corrective regimen sliding scale   SubCutaneous at bedtime  insulin lispro (ADMELOG) corrective regimen sliding scale   SubCutaneous three times a day before meals  insulin lispro Injectable (ADMELOG) 2 Unit(s) SubCutaneous three times a day before meals    -------------------------------------------------------------------------------------------  Cardiovascular Diagnostic Testing:  Tele: 2:1 and 3:1 AV block, c/w Mobitz type 1    TTE:      -------------------------------------------------------------------------------------------  Assessment and Plan:   68 yo F with PMHx HTN, T2DM admitted for episodes of dyspnea and weakness on exertion. Cardiology consulted for 2:1 and 3:1 AV block, in Mobitz 1 as seen by occasional 3:2 groups seen on telemetry. EKGs at her last hospitalization also noted Mobitz 1 heart block. The etiology of her weakness episodes is unclear, and unlikely to be secondary to the 2:1 AV block as her HRs have remained in 50s and 60s and her symptoms are profound with even minimal activity. It is reasonable to consider an ischemic evaluation in this patient with risk factors and symptoms on exertion. TTE w/ significant SRIKANTH which may explain sx of exertional fatigue/dyspnea.    Problems:  1. HOCM w/ SRIKANTH - noted on TTE from 10/2  1. Mobitz type 1 AV block - TSH wnl, lyme neg. Likely not driving sx. Found to have SRIKANTH on TTE.  2. HTN    Recommendations:  - TTE w/ significant SRIKANTH - please start disopyramide 100 TID, check QTC in 3d; hold for HR < 50  - Pt unable to exercise and given Mobitz Type 1 block cannot get pharm nuclear test, will defer at this time as can get exercise stress as outpt  - hold all diuretics, ensure pt is well hydrated  - if pt is still admitted, can get r/p TTE on 10/5 to assess LVOT gradient  - tele  - pt will need to f/up w/ Dr. Tyler on f/up     *** Recommendations are preliminary until cosigned by the attending.    Pancho Cavazos MD  Cardiology Fellow    For all New Consults and Questions:  [email or web address]   Login: [email or web address] Progress Note  ------------------------------------------------------------------------------------------    SUBJECTIVE/EVENTS::  - Tele: mobitz Type 1  - NAEO      -------------------------------------------------------------------------------------------  Meds:  benzocaine/menthol Lozenge 1 Lozenge Oral every 4 hours PRN  benzonatate 100 milliGRAM(s) Oral three times a day  chlorhexidine 2% Cloths 1 Application(s) Topical <User Schedule>  dextrose 5%. 1000 milliLiter(s) IV Continuous <Continuous>  dextrose 5%. 1000 milliLiter(s) IV Continuous <Continuous>  dextrose 50% Injectable 12.5 Gram(s) IV Push once  dextrose 50% Injectable 25 Gram(s) IV Push once  dextrose 50% Injectable 25 Gram(s) IV Push once  dextrose Oral Gel 15 Gram(s) Oral once PRN  glucagon  Injectable 1 milliGRAM(s) IntraMuscular once  guaiFENesin Oral Liquid (Sugar-Free) 100 milliGRAM(s) Oral every 6 hours PRN  heparin   Injectable 5000 Unit(s) SubCutaneous three times a day  influenza  Vaccine (HIGH DOSE) 0.7 milliLiter(s) IntraMuscular once  insulin glargine Injectable (LANTUS) 6 Unit(s) SubCutaneous at bedtime  insulin lispro (ADMELOG) corrective regimen sliding scale   SubCutaneous at bedtime  insulin lispro (ADMELOG) corrective regimen sliding scale   SubCutaneous three times a day before meals  insulin lispro Injectable (ADMELOG) 2 Unit(s) SubCutaneous three times a day before meals      -------------------------------------------------------------------------------------------  ROS:  CV: chest pain (-), palpitation (-), orthopnea (-), PND (-), edema (-)  PULM: SOB (-), cough (-), wheezing (-), hemoptysis (-).   CONST: fever (-), chills (-) or fatigue (-)  GI: abdominal distension (-), abdominal pain (-) , nausea/vomiting (-), hematemesis, (-), melena (-), hematochezia (-)  : dysuria (-), frequency (-), hematuria (-).   NEURO: numbness (-), weakness (-), dizziness (-)  MSK: myalgia (-), joint pain (-)   SKIN: itching (-), rash (-)  HEENT:  visual changes (-); vertigo or throat pain (-);  neck stiffness (-)   Psych: change in mood (-), anxiety (-), depression (-)     All other review of systems is negative unless indicated above.   -------------------------------------------------------------------------------------------  VS:  T(F): 98 (10-02), Max: 99 (10-02)  HR: 92 (10-02) (61 - 92)  BP: 154/66 (10-02) (109/78 - 176/71)  RR: 18 (10-02)  SpO2: 95% (10-02)    I&O's Summary  01 Oct 2023 07:01  -  02 Oct 2023 07:00  --------------------------------------------------------  IN: 630 mL / OUT: 1200 mL / NET: -570 mL      ------------------------------------------------------------------------------------------  PHYSICAL EXAM:  GEN: NAD, sitting in bed  HEENT: NCAT, EOMI  CV: RRR, Ns1/s2, 3/6 systolic murmur, JVP not elevated  RESP: CTA B/l, no w/r/r  ABD: soft, nt, nd  Ext: warm, 2+ pulses, no edema  Neuro: AAOx3, no focal defcits    -------------------------------------------------------------------------------------------  LABS:                      9.8    4.80  )-----------( 153      ( 02 Oct 2023 06:48 )             30.8     10-02  133<L>  |  104  |  52<H>  ----------------------------<  355<H>  4.3   |  19<L>  |  1.93<H>    Ca    9.3      02 Oct 2023 06:45  Phos  3.8     10-02  Mg     1.8     10-02    CARDIAC MARKERS ( 27 Sep 2023 07:22 )  28 ng/L / x     / x     / x     / x     / x      CARDIAC MARKERS ( 26 Sep 2023 21:45 )  28 ng/L / x     / x     / x     / x     / x          TTE W or WO Ultrasound Enhancing Agent (10.02.23 @ 09:49)   CONCLUSIONS:    1. Left ventricular cavity is normally sized. Left ventricular wall thickness is mildly increased. Left ventricular systolic function is normal with an ejection fraction of 73 % by Alonso's method of disks.   2. Hypertrophic obstructive cardiomyopathy (HOCM).   3. Severe discrete  basal septal hypertrophy.   4. The left ventricular outflow tract resting gradient is 108 mmHg and 160 mmHg using the Valsalva maneuver.      -------------------------------------------------------------------------------------------  Cardiovascular Diagnostic Testing:  Tele: 2:1 and 3:1 AV block, c/w Mobitz type 1            -------------------------------------------------------------------------------------------  Assessment and Plan:   66 yo F with PMHx HTN, T2DM admitted for episodes of dyspnea and weakness on exertion. Cardiology consulted for 2:1 and 3:1 AV block, in Mobitz 1 as seen by occasional 3:2 groups seen on telemetry. EKGs at her last hospitalization also noted Mobitz 1 heart block. The etiology of her weakness episodes is unclear, and unlikely to be secondary to the 2:1 AV block as her HRs have remained in 50s and 60s and her symptoms are profound with even minimal activity. It is reasonable to consider an ischemic evaluation in this patient with risk factors and symptoms on exertion. TTE w/ significant SRIKANTH and elevated outflow gradient, c/w HOCM which may explain sx of exertional fatigue/dyspnea.    Problems:  1. HOCM w/ SRIKANTH - noted on TTE from 10/2  1. Mobitz type 1 AV block - TSH wnl, lyme neg. Likely not driving sx. Found to have SRIKANTH on TTE.  2. HTN    Recommendations:  - TTE w/ significant SRIKANTH - please start disopyramide 100 TID, check QTC in 3d; hold for HR < 50  - Pt unable to exercise and given Mobitz Type 1 block cannot get pharm nuclear test, will defer at this time as can get exercise stress as outpt  - hold all diuretics, ensure pt is well hydrated  - if pt is still admitted, can get r/p TTE on 10/5 to re-assess LVOT gradient  - tele  - pt will need to f/up w/ Dr. Tyler on f/up       Pancho Cavazos MD  Cardiology Fellow    Plan discussed with cardiology fellow.  Patient seen and examined.  Hx., exam and labs as above.  I agree with the assessment and recommendations, which I have reviewed and edited where appropriate.  Kenneth Toscano M.D.  Cardiology Attending, Consult Service    For Cardiology consults and questions, all Cardiology service information can be found 24/7 on [email or web address] - use password: [email or web address] to log in.

## 2023-10-03 ENCOUNTER — TRANSCRIPTION ENCOUNTER (OUTPATIENT)
Age: 68
End: 2023-10-03

## 2023-10-03 DIAGNOSIS — I42.1 OBSTRUCTIVE HYPERTROPHIC CARDIOMYOPATHY: ICD-10-CM

## 2023-10-03 LAB
ALBUMIN SERPL ELPH-MCNC: 3.5 G/DL — SIGNIFICANT CHANGE UP (ref 3.3–5)
ALP SERPL-CCNC: 64 U/L — SIGNIFICANT CHANGE UP (ref 40–120)
ALT FLD-CCNC: 15 U/L — SIGNIFICANT CHANGE UP (ref 10–45)
ANION GAP SERPL CALC-SCNC: 10 MMOL/L — SIGNIFICANT CHANGE UP (ref 5–17)
AST SERPL-CCNC: 13 U/L — SIGNIFICANT CHANGE UP (ref 10–40)
BASOPHILS # BLD AUTO: 0.02 K/UL — SIGNIFICANT CHANGE UP (ref 0–0.2)
BASOPHILS NFR BLD AUTO: 0.5 % — SIGNIFICANT CHANGE UP (ref 0–2)
BILIRUB SERPL-MCNC: 0.3 MG/DL — SIGNIFICANT CHANGE UP (ref 0.2–1.2)
BUN SERPL-MCNC: 34 MG/DL — HIGH (ref 7–23)
CALCIUM SERPL-MCNC: 9.1 MG/DL — SIGNIFICANT CHANGE UP (ref 8.4–10.5)
CHLORIDE SERPL-SCNC: 107 MMOL/L — SIGNIFICANT CHANGE UP (ref 96–108)
CO2 SERPL-SCNC: 21 MMOL/L — LOW (ref 22–31)
CREAT SERPL-MCNC: 1.81 MG/DL — HIGH (ref 0.5–1.3)
EGFR: 30 ML/MIN/1.73M2 — LOW
EOSINOPHIL # BLD AUTO: 0.15 K/UL — SIGNIFICANT CHANGE UP (ref 0–0.5)
EOSINOPHIL NFR BLD AUTO: 3.7 % — SIGNIFICANT CHANGE UP (ref 0–6)
GLUCOSE BLDC GLUCOMTR-MCNC: 194 MG/DL — HIGH (ref 70–99)
GLUCOSE BLDC GLUCOMTR-MCNC: 209 MG/DL — HIGH (ref 70–99)
GLUCOSE BLDC GLUCOMTR-MCNC: 253 MG/DL — HIGH (ref 70–99)
GLUCOSE BLDC GLUCOMTR-MCNC: 336 MG/DL — HIGH (ref 70–99)
GLUCOSE SERPL-MCNC: 218 MG/DL — HIGH (ref 70–99)
HCT VFR BLD CALC: 30 % — LOW (ref 34.5–45)
HGB BLD-MCNC: 9.6 G/DL — LOW (ref 11.5–15.5)
IMM GRANULOCYTES NFR BLD AUTO: 0.7 % — SIGNIFICANT CHANGE UP (ref 0–0.9)
LYMPHOCYTES # BLD AUTO: 1.32 K/UL — SIGNIFICANT CHANGE UP (ref 1–3.3)
LYMPHOCYTES # BLD AUTO: 32.4 % — SIGNIFICANT CHANGE UP (ref 13–44)
MAGNESIUM SERPL-MCNC: 1.8 MG/DL — SIGNIFICANT CHANGE UP (ref 1.6–2.6)
MCHC RBC-ENTMCNC: 27 PG — SIGNIFICANT CHANGE UP (ref 27–34)
MCHC RBC-ENTMCNC: 32 GM/DL — SIGNIFICANT CHANGE UP (ref 32–36)
MCV RBC AUTO: 84.3 FL — SIGNIFICANT CHANGE UP (ref 80–100)
MONOCYTES # BLD AUTO: 0.43 K/UL — SIGNIFICANT CHANGE UP (ref 0–0.9)
MONOCYTES NFR BLD AUTO: 10.5 % — SIGNIFICANT CHANGE UP (ref 2–14)
NEUTROPHILS # BLD AUTO: 2.13 K/UL — SIGNIFICANT CHANGE UP (ref 1.8–7.4)
NEUTROPHILS NFR BLD AUTO: 52.2 % — SIGNIFICANT CHANGE UP (ref 43–77)
NRBC # BLD: 0 /100 WBCS — SIGNIFICANT CHANGE UP (ref 0–0)
PHOSPHATE SERPL-MCNC: 3.5 MG/DL — SIGNIFICANT CHANGE UP (ref 2.5–4.5)
PLATELET # BLD AUTO: 155 K/UL — SIGNIFICANT CHANGE UP (ref 150–400)
POTASSIUM SERPL-MCNC: 4.3 MMOL/L — SIGNIFICANT CHANGE UP (ref 3.5–5.3)
POTASSIUM SERPL-SCNC: 4.3 MMOL/L — SIGNIFICANT CHANGE UP (ref 3.5–5.3)
PROT SERPL-MCNC: 6.5 G/DL — SIGNIFICANT CHANGE UP (ref 6–8.3)
RBC # BLD: 3.56 M/UL — LOW (ref 3.8–5.2)
RBC # FLD: 13.9 % — SIGNIFICANT CHANGE UP (ref 10.3–14.5)
SODIUM SERPL-SCNC: 138 MMOL/L — SIGNIFICANT CHANGE UP (ref 135–145)
WBC # BLD: 4.08 K/UL — SIGNIFICANT CHANGE UP (ref 3.8–10.5)
WBC # FLD AUTO: 4.08 K/UL — SIGNIFICANT CHANGE UP (ref 3.8–10.5)

## 2023-10-03 PROCEDURE — 99231 SBSQ HOSP IP/OBS SF/LOW 25: CPT | Mod: GC

## 2023-10-03 PROCEDURE — 99233 SBSQ HOSP IP/OBS HIGH 50: CPT

## 2023-10-03 PROCEDURE — 99233 SBSQ HOSP IP/OBS HIGH 50: CPT | Mod: GC

## 2023-10-03 RX ORDER — ACETAMINOPHEN 500 MG
650 TABLET ORAL EVERY 6 HOURS
Refills: 0 | Status: DISCONTINUED | OUTPATIENT
Start: 2023-10-03 | End: 2023-10-05

## 2023-10-03 RX ADMIN — Medication 3: at 11:49

## 2023-10-03 RX ADMIN — Medication 100 MILLIGRAM(S): at 05:28

## 2023-10-03 RX ADMIN — INSULIN GLARGINE 6 UNIT(S): 100 INJECTION, SOLUTION SUBCUTANEOUS at 22:33

## 2023-10-03 RX ADMIN — Medication 100 MILLIGRAM(S): at 05:27

## 2023-10-03 RX ADMIN — HEPARIN SODIUM 5000 UNIT(S): 5000 INJECTION INTRAVENOUS; SUBCUTANEOUS at 13:36

## 2023-10-03 RX ADMIN — Medication 100 MILLIGRAM(S): at 13:36

## 2023-10-03 RX ADMIN — Medication 100 MILLIGRAM(S): at 22:34

## 2023-10-03 RX ADMIN — Medication 2 UNIT(S): at 11:49

## 2023-10-03 RX ADMIN — Medication 650 MILLIGRAM(S): at 15:34

## 2023-10-03 RX ADMIN — HEPARIN SODIUM 5000 UNIT(S): 5000 INJECTION INTRAVENOUS; SUBCUTANEOUS at 22:34

## 2023-10-03 RX ADMIN — Medication 1: at 08:28

## 2023-10-03 RX ADMIN — Medication 2 UNIT(S): at 17:57

## 2023-10-03 RX ADMIN — Medication 2: at 17:56

## 2023-10-03 RX ADMIN — Medication 2: at 22:33

## 2023-10-03 RX ADMIN — HEPARIN SODIUM 5000 UNIT(S): 5000 INJECTION INTRAVENOUS; SUBCUTANEOUS at 05:27

## 2023-10-03 RX ADMIN — Medication 650 MILLIGRAM(S): at 16:34

## 2023-10-03 RX ADMIN — Medication 2 UNIT(S): at 08:29

## 2023-10-03 RX ADMIN — CHLORHEXIDINE GLUCONATE 1 APPLICATION(S): 213 SOLUTION TOPICAL at 05:28

## 2023-10-03 NOTE — DISCHARGE NOTE PROVIDER - NSDCFUADDAPPT_GEN_ALL_CORE_FT
APPTS ARE READY TO BE MADE: [X] YES    Best Family or Patient Contact (if needed):    Additional Information about above appointments (if needed):    1:   2:   3:     Other comments or requests:    APPTS ARE READY TO BE MADE: [X] YES    Best Family or Patient Contact (if needed):    Additional Information about above appointments (if needed):    1: Cardiology - Dr. Tyler - patient MUST see Dr. Tyler during the week of 10/9/23   2: PCP - Dr. Chase - within 5-7 days of discharge  3:     Other comments or requests:    APPTS ARE READY TO BE MADE: [X] YES    Best Family or Patient Contact (if needed):    Additional Information about above appointments (if needed):    1: Cardiology - Dr. Tyler on 10/13/23  2: PCP - Dr. Chase - within 5-7 days of discharge    Other comments or requests:    APPTS ARE READY TO BE MADE: [X] YES    Best Family or Patient Contact (if needed):    Additional Information about above appointments (if needed):    1: Cardiology - Dr. Tyler on 10/13/23  2: PCP - Dr. Chase - within 5-7 days of discharge    Other comments or requests:   Patient was previously scheduled for an appointment on 10/13 at 3:00pm at [address] with Dr. Raimundo Tyler     Outreached on (10/5, 10/6 and 10/9) which have been unsuccessful. Will await call back from patient/caregiver to coordinate follow up care.

## 2023-10-03 NOTE — PROGRESS NOTE ADULT - SUBJECTIVE AND OBJECTIVE BOX
Cardiology Progress Note  ------------------------------------------------------------------------------------------    SUBJECTIVE/EVENTS::  - Tele: Mobitz type 1 AV block, no events  - NAEO    -------------------------------------------------------------------------------------------  ROS:  CV: chest pain (-), palpitation (-), orthopnea (-), PND (-), edema (-)  PULM: SOB (-), cough (-), wheezing (-), hemoptysis (-).   CONST: fever (-), chills (-) or fatigue (-)  GI: abdominal distension (-), abdominal pain (-) , nausea/vomiting (-), hematemesis, (-), melena (-), hematochezia (-)  : dysuria (-), frequency (-), hematuria (-).   NEURO: numbness (-), weakness (-), dizziness (-)  MSK: myalgia (-), joint pain (-)   SKIN: itching (-), rash (-)  HEENT:  visual changes (-); vertigo or throat pain (-);  neck stiffness (-)   Psych: change in mood (-), anxiety (-), depression (-)     All other review of systems is negative unless indicated above.   -------------------------------------------------------------------------------------------  VS:  T(F): 98 (10-03), Max: 98.9 (10-02)  HR: 88 (10-03) (76 - 95)  BP: 149/81 (10-03) (147/66 - 159/92)  RR: 18 (10-03)  SpO2: 97% (10-03)  I&O's Summary    02 Oct 2023 07:01  -  03 Oct 2023 07:00  --------------------------------------------------------  IN: 0 mL / OUT: 1700 mL / NET: -1700 mL    03 Oct 2023 07:01  -  03 Oct 2023 09:39  --------------------------------------------------------  IN: 240 mL / OUT: 0 mL / NET: 240 mL      ------------------------------------------------------------------------------------------  PHYSICAL EXAM:  GEN: NAD, sitting in bed  HEENT: NCAT, EOMI  CV: RRR, Ns1/s2, 3/6 systolic murmur, JVP not elevated  RESP: CTA B/l, no w/r/r  ABD: soft, nt, nd  Ext: warm, 2+ pulses, no edema  Neuro: AAOx3, no focal defcits    -------------------------------------------------------------------------------------------  LABS:                          9.6    4.08  )-----------( 155      ( 03 Oct 2023 07:04 )             30.0     10-03    138  |  107  |  34<H>  ----------------------------<  218<H>  4.3   |  21<L>  |  1.81<H>    Ca    9.1      03 Oct 2023 06:55  Phos  3.5     10-03  Mg     1.8     10-03    TPro  6.5  /  Alb  3.5  /  TBili  0.3  /  DBili  x   /  AST  13  /  ALT  15  /  AlkPhos  64  10-03      CARDIAC MARKERS ( 27 Sep 2023 07:22 )  28 ng/L / x     / x     / x     / x     / x      CARDIAC MARKERS ( 26 Sep 2023 21:45 )  28 ng/L / x     / x     / x     / x     / x                -------------------------------------------------------------------------------------------  Meds:  benzocaine/menthol Lozenge 1 Lozenge Oral every 4 hours PRN  benzonatate 100 milliGRAM(s) Oral three times a day  chlorhexidine 2% Cloths 1 Application(s) Topical <User Schedule>  dextrose 5%. 1000 milliLiter(s) IV Continuous <Continuous>  dextrose 5%. 1000 milliLiter(s) IV Continuous <Continuous>  dextrose 50% Injectable 12.5 Gram(s) IV Push once  dextrose 50% Injectable 25 Gram(s) IV Push once  dextrose 50% Injectable 25 Gram(s) IV Push once  dextrose Oral Gel 15 Gram(s) Oral once PRN  disopyramide 100 milliGRAM(s) Oral every 8 hours  glucagon  Injectable 1 milliGRAM(s) IntraMuscular once  guaiFENesin Oral Liquid (Sugar-Free) 100 milliGRAM(s) Oral every 6 hours PRN  heparin   Injectable 5000 Unit(s) SubCutaneous three times a day  influenza  Vaccine (HIGH DOSE) 0.7 milliLiter(s) IntraMuscular once  insulin glargine Injectable (LANTUS) 6 Unit(s) SubCutaneous at bedtime  insulin lispro (ADMELOG) corrective regimen sliding scale   SubCutaneous at bedtime  insulin lispro (ADMELOG) corrective regimen sliding scale   SubCutaneous three times a day before meals  insulin lispro Injectable (ADMELOG) 2 Unit(s) SubCutaneous three times a day before meals    -------------------------------------------------------------------------------------------  Cardiovascular Diagnostic Testing:  Tele: 2:1 and 3:1 AV block, c/w Mobitz type 1    TTE:  CONCLUSIONS:      1. Left ventricular cavity is normally sized. Left ventricular wall thickness is mildly increased. Left ventricular systolic function is normal with an ejection fraction of 73 % by Alonso's method of disks.   2. Hypertrophic obstructive cardiomyopathy (HOCM).   3. Severe discretebasal septal hypertrophy.   4. The left ventricular outflow tract resting gradient is 108 mmHg and 160 mmHg using the Valsalva maneuver.      -------------------------------------------------------------------------------------------  Assessment and Plan:   66 yo F with PMHx HTN, T2DM admitted for episodes of dyspnea and weakness on exertion. Cardiology consulted for 2:1 and 3:1 AV block, in Mobitz 1 as seen by occasional 3:2 groups seen on telemetry. EKGs at her last hospitalization also noted Mobitz 1 heart block. The etiology of her weakness episodes is unclear, and unlikely to be secondary to the 2:1 AV block as her HRs have remained in 50s and 60s and her symptoms are profound with even minimal activity. It is reasonable to consider an ischemic evaluation in this patient with risk factors and symptoms on exertion. TTE w/ significant SRIKANTH and elevated outflow gradient, c/w HOCM which may explain sx of exertional fatigue/dyspnea.    Problems:  1. HOCM w/ SRIKANTH - noted on TTE from 10/2  1. Mobitz type 1 AV block - TSH wnl, lyme neg. Likely not driving sx. Found to have SRIKANTH on TTE.  2. HTN    Recommendations:  - TTE w/ significant SRIKANTH - please continue disopyramide 100 TID, check QTC in 3d; hold for HR < 50  - hold all diuretics, ensure pt is well hydrated, avoid anti-HTN meds that can lead to worsening obstruction  - monitor on tele  - from cardiology perspective, can be dc'ed on Disopyramide w/ f/up w/ Dr. Tyler in clinic late next week for r/p TTE, once cleared from COVID infx  - if pt is still admitted, can get r/p TTE on 10/5 to re-assess LVOT gradient      Pancho Cavazos MD  Cardiology Fellow Cardiology Progress Note  ------------------------------------------------------------------------------------------    SUBJECTIVE/EVENTS::  - Tele: Ade type 1 AV block, no events  - NAEO      -------------------------------------------------------------------------------------------  Meds:  benzocaine/menthol Lozenge 1 Lozenge Oral every 4 hours PRN  benzonatate 100 milliGRAM(s) Oral three times a day  chlorhexidine 2% Cloths 1 Application(s) Topical <User Schedule>  dextrose 5%. 1000 milliLiter(s) IV Continuous <Continuous>  dextrose 5%. 1000 milliLiter(s) IV Continuous <Continuous>  dextrose 50% Injectable 12.5 Gram(s) IV Push once  dextrose 50% Injectable 25 Gram(s) IV Push once  dextrose 50% Injectable 25 Gram(s) IV Push once  dextrose Oral Gel 15 Gram(s) Oral once PRN  disopyramide 100 milliGRAM(s) Oral every 8 hours  glucagon  Injectable 1 milliGRAM(s) IntraMuscular once  guaiFENesin Oral Liquid (Sugar-Free) 100 milliGRAM(s) Oral every 6 hours PRN  heparin   Injectable 5000 Unit(s) SubCutaneous three times a day  influenza  Vaccine (HIGH DOSE) 0.7 milliLiter(s) IntraMuscular once  insulin glargine Injectable (LANTUS) 6 Unit(s) SubCutaneous at bedtime  insulin lispro (ADMELOG) corrective regimen sliding scale   SubCutaneous at bedtime  insulin lispro (ADMELOG) corrective regimen sliding scale   SubCutaneous three times a day before meals  insulin lispro Injectable (ADMELOG) 2 Unit(s) SubCutaneous three times a day before meals    -------------------------------------------------------------------------------------------  ROS:  CV: chest pain (-), palpitation (-), orthopnea (-), PND (-), edema (-)  PULM: SOB (-), cough (-), wheezing (-), hemoptysis (-).   CONST: fever (-), chills (-) or fatigue (-)  GI: abdominal distension (-), abdominal pain (-) , nausea/vomiting (-), hematemesis, (-), melena (-), hematochezia (-)  : dysuria (-), frequency (-), hematuria (-).   NEURO: numbness (-), weakness (-), dizziness (-)  MSK: myalgia (-), joint pain (-)   SKIN: itching (-), rash (-)  HEENT:  visual changes (-); vertigo or throat pain (-);  neck stiffness (-)   Psych: change in mood (-), anxiety (-), depression (-)     All other review of systems is negative unless indicated above.   -------------------------------------------------------------------------------------------  VS:  T(F): 98 (10-03), Max: 98.9 (10-02)  HR: 88 (10-03) (76 - 95)  BP: 149/81 (10-03) (147/66 - 159/92)  RR: 18 (10-03)  SpO2: 97% (10-03)    I&O's Summary  02 Oct 2023 07:01  -  03 Oct 2023 07:00  --------------------------------------------------------  IN: 0 mL / OUT: 1700 mL / NET: -1700 mL    03 Oct 2023 07:01  -  03 Oct 2023 09:39  --------------------------------------------------------  IN: 240 mL / OUT: 0 mL / NET: 240 mL      ------------------------------------------------------------------------------------------  PHYSICAL EXAM:  GEN: NAD, sitting in bed  HEENT: NCAT, EOMI  CV: RRR, Ns1/s2, 3/6 systolic murmur, JVP not elevated  RESP: CTA B/l, no w/r/r  ABD: soft, nt, nd  Ext: warm, 2+ pulses, no edema  Neuro: AAOx3, no focal defcits    -------------------------------------------------------------------------------------------  LABS:                      9.6    4.08  )-----------( 155      ( 03 Oct 2023 07:04 )             30.0     10-03  138  |  107  |  34<H>  ----------------------------<  218<H>  4.3   |  21<L>  |  1.81<H>    Ca    9.1      03 Oct 2023 06:55  Phos  3.5     10-03  Mg     1.8     10-03    TPro  6.5  /  Alb  3.5  /  TBili  0.3  /  DBili  x   /  AST  13  /  ALT  15  /  AlkPhos  64  10-03    CARDIAC MARKERS ( 27 Sep 2023 07:22 )  28 ng/L / x     / x     / x     / x     / x      CARDIAC MARKERS ( 26 Sep 2023 21:45 )  28 ng/L / x     / x     / x     / x     / x          -------------------------------------------------------------------------------------------  Cardiovascular Diagnostic Testing:    Tele: 2:1 and 3:1 AV block, c/w Mobitz type 1    TTE:  CONCLUSIONS:    1. Left ventricular cavity is normally sized. Left ventricular wall thickness is mildly increased. Left ventricular systolic function is normal with an ejection fraction of 73 % by Alonso's method of disks.   2. Hypertrophic obstructive cardiomyopathy (HOCM).   3. Severe discrete basal septal hypertrophy.   4. The left ventricular outflow tract resting gradient is 108 mmHg and 160 mmHg using the Valsalva maneuver.        -------------------------------------------------------------------------------------------  Assessment and Plan:   68 yo F with PMHx HTN, T2DM admitted for episodes of dyspnea and weakness on exertion. Cardiology consulted for 2:1 and 3:1 AV block, in Mobitz 1 as seen by occasional 3:2 groups seen on telemetry. EKGs at her last hospitalization also noted Mobitz 1 heart block. The etiology of her weakness episodes is unclear, and unlikely to be secondary to the 2:1 AV block as her HRs have remained in 50s and 60s and her symptoms are profound with even minimal activity. TTE w/ significant SRIKANTH and elevated outflow gradient, c/w HOCM which may explain sx of exertional fatigue/dyspnea.    Problems:  1. HOCM w/ SRIKANTH - noted on TTE from 10/2  2. Mobitz type 1 AV block - TSH wnl, lyme neg. Likely not driving sx. Found to have SRIKANTH on TTE.  3. HTN    Recommendations:  - TTE w/ significant SRIKANTH - please continue disopyramide 100 TID, check QTC in 3d; hold for HR < 50  - hold all diuretics, ensure pt is well hydrated, avoid anti-HTN meds that can lead to worsening obstruction  - monitor on tele  - from cardiology perspective, can be d/c'ed on Disopyramide w/ f/up w/ Dr. Tyler in clinic late next week for r/p TTE, once cleared from COVID infx  - if pt is still admitted, can get r/p TTE on 10/5 to re-assess LVOT gradient      Pancho Cavazos MD  Cardiology Fellow    Plan discussed with cardiology fellow.  Patient seen and examined.  Hx., exam and labs as above.  I agree with the assessment and recommendations, which I have reviewed and edited where appropriate.  Kenneth Toscano M.D.  Cardiology Attending, Consult Service    For Cardiology consults and questions, all Cardiology service information can be found 24/7 on [email or web address] - use password: [email or web address] to log in.

## 2023-10-03 NOTE — DISCHARGE NOTE PROVIDER - PROVIDER TOKENS
PROVIDER:[TOKEN:[[id number]],FOLLOWUP:[1 week]] PROVIDER:[TOKEN:[[id number]],FOLLOWUP:[1 week]],PROVIDER:[TOKEN:[[id number]],FOLLOWUP:[1 week]] PROVIDER:[TOKEN:[[id number]],SCHEDULEDAPPT:[10/13/2023],SCHEDULEDAPPTTIME:[03:00 PM]],PROVIDER:[[id number]],FOLLOWUP:[1 week]]

## 2023-10-03 NOTE — DISCHARGE NOTE PROVIDER - HOSPITAL COURSE
Discharge Summary     Admit Date: 09-27-23  Discharge Date: 10-4-23    Admission diagnoses:   ***  Dyspnea    Hospital Course:   For full details, please see H&P, progress notes, consult notes and ancillary notes.     67yoF w/ PMHx of hx HTN, DM2, recent hospitalization @ [facility] (9/24-9/25/23?) for sob and weakness, pw sob, lightheadedness, weakness, hypotension of unclear etiology course c/b likely hospital-acquired COVID 19. CXR clear, monitoring off Remdesivir at this time. Pt for HOCM w/ SRIKANTH - noted on TTE from 10/2. Mobitz type 1 AV block - TSH wnl, lyme neg. Likely not driving sx. Hypotension has resolved, orthostatics negative last. TTE performed here on 10/3/23: Left ventricular systolic function is normal with an ejection fraction of 73 % by Alonso's method of disks. Hypertrophic obstructive cardiomyopathy (HOCM). Severe discrete basal septal hypertrophy. The left ventricular outflow tract resting gradient is 108 mmHg and 160 mmHg using the Valsalva maneuver. Initiated Disopyrimide 100mg Q8H significant SRIKANTH per cardiology's recommendations w/ hold parameters for bradycardia; monitor QTc, plan for repeat TTE as outpatient per cardiology, w/ Dr. Tyler in clinic next week. As per cards, okay to d/c Disopyrimide on 10/4 and started on Metoprolol succinate. Hold all diuretics, ensure pt is well hydrated. NST treadmill as pt not a candidate for pharm per cards; patient not able to tolerate treadmill at this time -- can defer to outpatient per cards. Pt also with SOB, etiology likely related to HOCM/SRIKANTH as above vs. COVID 19 as above. S/p lasix in the ED - holding further diuretics as above, encouraging hydration. VQ negative for PE. BNP trending down with diuresis, increase in Cr unsure if cardiorenal but pt is no orthopneic, lungs clear, trace LLE edema; creatinine currently stable. Renal US wnl.    Medically cleared for d/c per medicine attending.    On day of discharge, patient is clinically stable with no new exam findings or acute symptoms compared to prior. The patient was seen by the attending physician on the date of discharge and deemed stable and acceptable for discharge. The patient's chronic medical conditions were treated accordingly per the patient's home medication regimen. The patient's medication reconciliation (with changes made to chronic medications), follow up appointments, discharge orders, instructions, and significant lab and diagnostic studies are as noted.     Discharge follow up action items:     1. Follow up with PCP in 1-2 weeks.   2. Medication changes: added Metoprolol succinate  4. On hold medications: hold Losartan    Patient's ordered code status: full    Patient disposition: home with home PT HPI: 67F c hx HTN, DM2, recent hospitalization @ [facility] (9/24-9/25/23?) for sob and weakness, pw sob, lightheadedness, weakness, hypotension. Pt states she was in usual state of health until 10 days ago, when her symptoms started. Pt states that on stand and walking, pt gets whole body weakness, lightheaded, palpitations. Pt states she checked her blood pressure and it was SBP ~88. Pt also reports SOB on exertion and on lying flat. Pt presented to OSH where she was found to have Cr of 1.7-1.8, hb of ~10, TTE grossly normal with EF 76%, renal ultrasound without hydronephrosis. All these results from OSH are on pt's phone. On discharge from OSH, pt was told to stop her losartan and metoprolol. Pt states her symptoms did not get any better, which brings her into [facility]. Pt reports flying back from [facility] in Jun '23, and while in [facility] she had leg swelling, but the swelling has since resolved. Pt denies fevers, CP, diarrhea, URI symptoms. Pt also denies focal arm/leg weakness. Pt reports her last stress test was 1 year ago, but it was terminated early as she was not feeling well from having covid at that time. unknown result of stress test. (27 Sep 2023 04:48)    Hospital Course: Patient developed hospital-acquired COVID 19 and TTE w/ significant SRIKANTH and elevated outflow gradient, c/w HOCM.     2019 novel coronavirus disease (COVID-19).   - monitored respi status (currently pt is not hypoxic; monitoring off Dexamethasone at this time) and hemodynamics closely  - monitored inflammatory markers  - CXR clear, monitoring off Remdesivir at this time  - incentive spirometry  - VTE PPx w/ Hep SC given LOPEZ    HOCM w/ SRIKANTH - noted on TTE from 10/2  Mobitz type 1 AV block - TSH wnl, lyme neg. Likely not driving sx. Found to have SRIKANTH on TTE.  - hypotension has resolved, orthostatics negative last checked on 10/1    - TTE result from 9/25/23 at OSH on pt's phone grossly normal with mild vavular dysfxn  - TTE performed here on 10/3/23: Left ventricular systolic function is normal with an ejection fraction of 73 % by Alonso's method of disks. Hypertrophic obstructive cardiomyopathy (HOCM). Severe discrete basal septal hypertrophy. The left ventricular outflow tract resting gradient is 108 mmHg and 160 mmHg using the Valsalva maneuver.  - initiated Disopyrimide 100mg Q8H significant SRIKANTH per cardiology's recommendations w/ hold parameters for bradycardia but on 10/4, per cardiology will stop disopyramide and start metop 25 succinate and will have pt f/up w/ Dr. Tyler at the end of next week, after completing COVID-19 isolation  - hold all diuretics, ensure pt is well hydrated, avoid anti-HTN meds that can lead to worsening obstruction  - NST treadmill as pt not a candidate for pharm per cards; patient not able to tolerate treadmill at this time -- can defer to outpatient per cards.     Important Medication Changes and Reason:  - NEW MEDICATIONS:   - STOP HOME MEDICATIONS:     Active or Pending Issues Requiring Follow-up:  - Follow-up with Cardiology (Dr. Tyler) within 5-7 days of discharge to monitor HOCM, AV Block, and to plan for treadmill stress testing  - Follow-up with Primary Care Physician within 5-7 days of discharge to monitor blood sugar / finger sticks for diabetes mellitus    Advanced Directives:   [X] Full code    Discharge Diagnoses:  - 2019 novel coronavirus disease (COVID-19)  - Hypertrophic cardiomyopathy with systolic anterior motion  - Mobitz Type 1 AV Block  - Diabetes Mellitus w/ Hyperglycemia HPI: 67F c hx HTN, DM2, recent hospitalization @ [facility] (9/24-9/25/23?) for sob and weakness, pw sob, lightheadedness, weakness, hypotension. Pt states she was in usual state of health until 10 days ago, when her symptoms started. Pt states that on stand and walking, pt gets whole body weakness, lightheaded, palpitations. Pt states she checked her blood pressure and it was SBP ~88. Pt also reports SOB on exertion and on lying flat. Pt presented to OSH where she was found to have Cr of 1.7-1.8, hb of ~10, TTE grossly normal with EF 76%, renal ultrasound without hydronephrosis. All these results from OSH are on pt's phone. On discharge from OSH, pt was told to stop her losartan and metoprolol. Pt states her symptoms did not get any better, which brings her into [facility]. Pt reports flying back from [facility] in Jun '23, and while in [facility] she had leg swelling, but the swelling has since resolved. Pt denies fevers, CP, diarrhea, URI symptoms. Pt also denies focal arm/leg weakness. Pt reports her last stress test was 1 year ago, but it was terminated early as she was not feeling well from having covid at that time. unknown result of stress test. (27 Sep 2023 04:48)    Hospital Course: Patient developed hospital-acquired COVID 19 and TTE w/ significant SRIKANTH and elevated outflow gradient, c/w HOCM.     2019 novel coronavirus disease (COVID-19).   - monitored respi status (currently pt is not hypoxic; monitoring off Dexamethasone at this time) and hemodynamics closely  - monitored inflammatory markers  - CXR clear, monitoring off Remdesivir at this time  - incentive spirometry  - VTE PPx w/ Hep SC given LOPEZ    HOCM w/ SRIKANTH - noted on TTE from 10/2  Mobitz type 1 AV block - TSH wnl, lyme neg. Likely not driving sx. Found to have SRIKANTH on TTE.  - hypotension has resolved, orthostatics negative last checked on 10/1    - TTE result from 9/25/23 at OSH on pt's phone grossly normal with mild vavular dysfxn  - TTE performed here on 10/3/23: Left ventricular systolic function is normal with an ejection fraction of 73 % by Alonso's method of disks. Hypertrophic obstructive cardiomyopathy (HOCM). Severe discrete basal septal hypertrophy. The left ventricular outflow tract resting gradient is 108 mmHg and 160 mmHg using the Valsalva maneuver.  - initiated Disopyrimide 100mg Q8H significant SRIKANTH per cardiology's recommendations w/ hold parameters for bradycardia but on 10/4, per cardiology will stop disopyramide and start metop 25 succinate and will have pt f/up w/ Dr. Tyler at the end of next week, after completing COVID-19 isolation  - hold all diuretics, ensure pt is well hydrated, avoid anti-HTN meds that can lead to worsening obstruction  - NST treadmill as pt not a candidate for pharm per cards; patient not able to tolerate treadmill at this time -- can defer to outpatient per cards.     Acute Kidney Injury  - improved with hydration prior to discharge; patient encouraged to continue oral hydration given cardiology's recommendations above, and to avoid diuretics and anti-hypertensive agents that wcan lead to worsening obstruction    Important Medication Changes and Reason:  - NEW MEDICATIONS: Metoprolol Succinate 25mg daily for HOCM w/ SRIKANTH    Active or Pending Issues Requiring Follow-up:  - Follow-up with Cardiology (Dr. Tyler) within 5-7 days of discharge to monitor HOCM, AV Block, and to plan for treadmill stress testing  - Follow-up with Primary Care Physician within 5-7 days of discharge to monitor blood sugar / finger sticks for diabetes mellitus    Advanced Directives:   [X] Full code    Discharge Diagnoses:  - 2019 novel coronavirus disease (COVID-19)  - Hypertrophic cardiomyopathy with systolic anterior motion  - Mobitz Type 1 AV Block  - Diabetes Mellitus w/ Hyperglycemia  - Acute Kidney Injury HPI: 67F c hx HTN, DM2, recent hospitalization @ [facility] (9/24-9/25/23?) for sob and weakness, pw sob, lightheadedness, weakness, hypotension. Pt states she was in usual state of health until 10 days ago, when her symptoms started. Pt states that on stand and walking, pt gets whole body weakness, lightheaded, palpitations. Pt states she checked her blood pressure and it was SBP ~88. Pt also reports SOB on exertion and on lying flat. Pt presented to OSH where she was found to have Cr of 1.7-1.8, hb of ~10, TTE grossly normal with EF 76%, renal ultrasound without hydronephrosis. All these results from OSH are on pt's phone. On discharge from OSH, pt was told to stop her losartan and metoprolol. Pt states her symptoms did not get any better, which brings her into [facility]. Pt reports flying back from [facility] in Jun '23, and while in [facility] she had leg swelling, but the swelling has since resolved. Pt denies fevers, CP, diarrhea, URI symptoms. Pt also denies focal arm/leg weakness. Pt reports her last stress test was 1 year ago, but it was terminated early as she was not feeling well from having covid at that time. unknown result of stress test. (27 Sep 2023 04:48)    Hospital Course: Patient developed hospital-acquired COVID 19 and TTE w/ significant SRIKANTH and elevated outflow gradient, c/w HOCM.     2019 novel coronavirus disease (COVID-19).   - monitored respi status (currently pt is not hypoxic; monitoring off Dexamethasone at this time) and hemodynamics closely  - monitored inflammatory markers  - CXR clear, monitoring off Remdesivir at this time  - incentive spirometry  - VTE PPx w/ Hep SC given LOPEZ    HOCM w/ SRIKANTH - noted on TTE from 10/2  Mobitz type 1 AV block - TSH wnl, lyme neg. Likely not driving sx. Found to have SRIKANTH on TTE.  - hypotension has resolved, orthostatics negative last checked on 10/1    - TTE result from 9/25/23 at OSH on pt's phone grossly normal with mild vavular dysfxn  - TTE performed here on 10/3/23: Left ventricular systolic function is normal with an ejection fraction of 73 % by Alonso's method of disks. Hypertrophic obstructive cardiomyopathy (HOCM). Severe discrete basal septal hypertrophy. The left ventricular outflow tract resting gradient is 108 mmHg and 160 mmHg using the Valsalva maneuver.  - initiated Disopyrimide 100mg Q8H significant SRIKANTH per cardiology's recommendations w/ hold parameters for bradycardia but on 10/4, per cardiology will stop disopyramide and start metop 25 succinate and will have pt f/up w/ Dr. Tyler at the end of next week, after completing COVID-19 isolation  - hold all diuretics, ensure pt is well hydrated, avoid anti-HTN meds that can lead to worsening obstruction  - NST treadmill as pt not a candidate for pharm per cards; patient not able to tolerate treadmill at this time -- can defer to outpatient per cards.     Acute Kidney Injury  - improved with hydration prior to discharge; patient encouraged to continue oral hydration given cardiology's recommendations above, and to avoid diuretics and anti-hypertensive agents that wcan lead to worsening obstruction    Important Medication Changes and Reason:  - NEW MEDICATIONS:   - STOP THESE HOME MEDICATIONS:  - CONTINUE THESE HOME MEDICATION:     Active or Pending Issues Requiring Follow-up:  - Follow-up with Cardiology (Dr. Tyler) within 5-7 days of discharge to monitor HOCM, AV Block, and to plan for treadmill stress testing  - Follow-up with Primary Care Physician within 5-7 days of discharge to monitor blood sugar / finger sticks for diabetes mellitus    Advanced Directives:   [X] Full code    Discharge Diagnoses:  - 2019 novel coronavirus disease (COVID-19)  - Hypertrophic cardiomyopathy with systolic anterior motion  - Mobitz Type 1 AV Block  - Diabetes Mellitus w/ Hyperglycemia  - Acute Kidney Injury HPI: 67F c hx HTN, DM2, recent hospitalization @ [facility] (9/24-9/25/23?) for sob and weakness, pw sob, lightheadedness, weakness, hypotension. Pt states she was in usual state of health until 10 days ago, when her symptoms started. Pt states that on stand and walking, pt gets whole body weakness, lightheaded, palpitations. Pt states she checked her blood pressure and it was SBP ~88. Pt also reports SOB on exertion and on lying flat. Pt presented to OSH where she was found to have Cr of 1.7-1.8, hb of ~10, TTE grossly normal with EF 76%, renal ultrasound without hydronephrosis. All these results from OSH are on pt's phone. On discharge from OSH, pt was told to stop her losartan and metoprolol. Pt states her symptoms did not get any better, which brings her into [facility]. Pt reports flying back from [facility] in Jun '23, and while in [facility] she had leg swelling, but the swelling has since resolved. Pt denies fevers, CP, diarrhea, URI symptoms. Pt also denies focal arm/leg weakness. Pt reports her last stress test was 1 year ago, but it was terminated early as she was not feeling well from having covid at that time. unknown result of stress test. (27 Sep 2023 04:48)    Hospital Course: Patient developed hospital-acquired COVID 19 and TTE w/ significant SRIKANTH and elevated outflow gradient, c/w HOCM.     2019 novel coronavirus disease (COVID-19).   - monitored respi status (currently pt is not hypoxic; monitoring off Dexamethasone at this time) and hemodynamics closely  - monitored inflammatory markers  - CXR clear, monitoring off Remdesivir at this time  - incentive spirometry  - VTE PPx w/ Hep SC given LOPEZ    HOCM w/ SRIKANTH - noted on TTE from 10/2  Mobitz type 1 AV block - TSH wnl, lyme neg. Likely not driving sx. Found to have SRIKANTH on TTE.  - hypotension has resolved, orthostatics negative last checked on 10/1    - TTE result from 9/25/23 at OSH on pt's phone grossly normal with mild vavular dysfxn  - TTE performed here on 10/3/23: Left ventricular systolic function is normal with an ejection fraction of 73 % by Alonso's method of disks. Hypertrophic obstructive cardiomyopathy (HOCM). Severe discrete basal septal hypertrophy. The left ventricular outflow tract resting gradient is 108 mmHg and 160 mmHg using the Valsalva maneuver.  - initiated Disopyrimide 100mg Q8H significant SRIKANTH per cardiology's recommendations w/ hold parameters for bradycardia but on 10/4, per cardiology will stop disopyramide and start metop 25 succinate and will have pt f/up w/ Dr. Tyler at the end of next week, after completing COVID-19 isolation  - hold all diuretics, ensure pt is well hydrated, avoid anti-HTN meds that can lead to worsening obstruction  - NST treadmill as pt not a candidate for pharm per cards; patient not able to tolerate treadmill at this time -- can defer to outpatient per cards.     Acute Kidney Injury  - improved with hydration prior to discharge; patient encouraged to continue oral hydration given cardiology's recommendations above, and to avoid diuretics and anti-hypertensive agents that wcan lead to worsening obstruction    Important Medication Changes and Reason:  - NEW MEDICATIONS:   - STOP THESE HOME MEDICATIONS:  - CONTINUE THESE HOME MEDICATIONS:     Active or Pending Issues Requiring Follow-up:  - Follow-up with Cardiology (Dr. Tyler) within 5-7 days of discharge to monitor HOCM, AV Block, and to plan for treadmill stress testing  - Follow-up with Primary Care Physician within 5-7 days of discharge to monitor blood sugar / finger sticks for diabetes mellitus    Advanced Directives:   [X] Full code    Discharge Diagnoses:  - 2019 novel coronavirus disease (COVID-19)  - Hypertrophic cardiomyopathy with systolic anterior motion  - Mobitz Type 1 AV Block  - Diabetes Mellitus w/ Hyperglycemia  - Acute Kidney Injury HPI: 67F c hx HTN, DM2, recent hospitalization @ [facility] (9/24-9/25/23?) for sob and weakness, pw sob, lightheadedness, weakness, hypotension. Pt states she was in usual state of health until 10 days ago, when her symptoms started. Pt states that on stand and walking, pt gets whole body weakness, lightheaded, palpitations. Pt states she checked her blood pressure and it was SBP ~88. Pt also reports SOB on exertion and on lying flat. Pt presented to OSH where she was found to have Cr of 1.7-1.8, hb of ~10, TTE grossly normal with EF 76%, renal ultrasound without hydronephrosis. All these results from OSH are on pt's phone. On discharge from OSH, pt was told to stop her losartan and metoprolol. Pt states her symptoms did not get any better, which brings her into [facility]. Pt reports flying back from [facility] in Jun '23, and while in [facility] she had leg swelling, but the swelling has since resolved. Pt denies fevers, CP, diarrhea, URI symptoms. Pt also denies focal arm/leg weakness. Pt reports her last stress test was 1 year ago, but it was terminated early as she was not feeling well from having covid at that time. unknown result of stress test. (27 Sep 2023 04:48)    Hospital Course: Patient developed hospital-acquired COVID 19 and TTE w/ significant SRIKANTH and elevated outflow gradient, c/w HOCM.     2019 novel coronavirus disease (COVID-19).   - monitored respi status (currently pt is not hypoxic; monitoring off Dexamethasone at this time) and hemodynamics closely  - monitored inflammatory markers  - CXR clear, monitoring off Remdesivir at this time  - incentive spirometry  - VTE PPx w/ Hep SC given LOPEZ    HOCM w/ SRIKANTH - noted on TTE from 10/2  Mobitz type 1 AV block - TSH wnl, lyme neg. Likely not driving sx. Found to have SRIKANTH on TTE.  - hypotension has resolved, orthostatics negative last checked on 10/1    - TTE result from 9/25/23 at OSH on pt's phone grossly normal with mild vavular dysfxn  - TTE performed here on 10/3/23: Left ventricular systolic function is normal with an ejection fraction of 73 % by Alonso's method of disks. Hypertrophic obstructive cardiomyopathy (HOCM). Severe discrete basal septal hypertrophy. The left ventricular outflow tract resting gradient is 108 mmHg and 160 mmHg using the Valsalva maneuver.  - initiated Disopyrimide 100mg Q8H significant SRIKANTH per cardiology's recommendations w/ hold parameters for bradycardia but on 10/4, per cardiology will stop disopyramide and start metop 25 succinate and will have pt f/up w/ Dr. Tyler at the end of next week, after completing COVID-19 isolation  - hold all diuretics, ensure pt is well hydrated, avoid anti-HTN meds that can lead to worsening obstruction  - NST treadmill as pt not a candidate for pharm per cards; patient not able to tolerate treadmill at this time -- can defer to outpatient per cards.     Acute Kidney Injury  - improved with hydration prior to discharge; patient encouraged to continue oral hydration given cardiology's recommendations above, and to avoid diuretics and anti-hypertensive agents that wcan lead to worsening obstruction    Important Medication Changes and Reason:  - NEW MEDICATIONS: Metoprolol succinate  - STOP THESE HOME MEDICATIONS: Pravastatin  - CONTINUE THESE HOME MEDICATIONS: Metformin, Glimepiride    Active or Pending Issues Requiring Follow-up:  - Follow-up with Cardiology (Dr. Tyler) on 10/13/23 at 3pm to monitor HOCM, AV Block, and to plan for treadmill stress testing  - Follow-up with Primary Care Physician within 5-7 days of discharge to monitor blood sugar / finger sticks for diabetes mellitus    Advanced Directives:   [X] Full code    Discharge Diagnoses:  - 2019 novel coronavirus disease (COVID-19)  - Hypertrophic cardiomyopathy with systolic anterior motion  - Mobitz Type 1 AV Block  - Diabetes Mellitus w/ Hyperglycemia  - Acute Kidney Injury

## 2023-10-03 NOTE — PROGRESS NOTE ADULT - SUBJECTIVE AND OBJECTIVE BOX
Charlie Torres M.D.  Division of Hospital Medicine  Available on [employer] TEAMS    SUBJECTIVE / ACUTE INTERVAL EVENTS: Patient seen and examined. Tele overnight reportedly w/ bradycardia and Mobitz type 1 AV block. Patient states she is feeling much improved compared to yesterday as cough has been better controlled. She denies CP, palpitations, SOB/CAGE, or have abd pain n/v/d/c. Discussed intermittent bradycardia w/ cardiology, non-sustained, and pt w/o noted associated symptoms -- ok to continue Norpace as initiated overnight. Plan to check QTc tomorrow AM, and if patient remains improved symptomatically re: COVID 19 infection, she can be discharged home w/ close outpatient cardiology f/u for QTc monitoring and repeat TTE. Will discuss this plan with patient and her family.       OBJECTIVE:   Vital Signs Last 24 Hrs  T(F): 98.4 (03 Oct 2023 10:51), Max: 98.9 (02 Oct 2023 21:00)  HR: 88 (03 Oct 2023 04:00) (76 - 95)  BP: 149/81 (03 Oct 2023 04:00) (147/66 - 159/92)  RR: 18 (03 Oct 2023 10:51) (18 - 18)  SpO2: 98% (03 Oct 2023 10:51) (97% - 98%)    Parameters below as of 03 Oct 2023 10:51  Patient On (Oxygen Delivery Method): room air    I&O's Summary  02 Oct 2023 07:01  -  03 Oct 2023 07:00  --------------------------------------------------------  IN: 0 mL / OUT: 1700 mL / NET: -1700 mL    03 Oct 2023 07:01  -  03 Oct 2023 12:27  --------------------------------------------------------  IN: 520 mL / OUT: 400 mL / NET: 120 mL    Physical Examination:  GEN: middle aged woman, ambulating in room in [facility]  PSYCH: A&Ox3, mood and affect appear appropriate   NEURO: no focal neurologic deficits appreciated  RESPI: no accessory muscle use, B/L air entry, CTAB   CARDIO: regular rate/rhythm, no LE edema B/L  ABD: soft, NT, ND  EXT: patient able to move all extremities spontaneously  VASC: peripheral pulses palpated    Labs:  CAPILLARY BLOOD GLUCOSE  POCT Blood Glucose.: 253 mg/dL (03 Oct 2023 11:35)  POCT Blood Glucose.: 194 mg/dL (03 Oct 2023 07:31)  POCT Blood Glucose.: 247 mg/dL (02 Oct 2023 21:46)  POCT Blood Glucose.: 248 mg/dL (02 Oct 2023 17:37)                        9.6    4.08  )-----------( 155      ( 03 Oct 2023 07:04 )             30.0     10-03  138  |  107  |  34<H>  ----------------------------<  218<H>  4.3   |  21<L>  |  1.81<H>    Ca    9.1      03 Oct 2023 06:55  Phos  3.5     10-03  Mg     1.8     10-03    TPro  6.5  /  Alb  3.5  /  TBili  0.3  /  DBili  x   /  AST  13  /  ALT  15  /  AlkPhos  64  10-03    Urinalysis Basic - ( 03 Oct 2023 06:55 )  Color: x / Appearance: x / SG: x / pH: x  Gluc: 218 mg/dL / Ketone: x  / Bili: x / Urobili: x   Blood: x / Protein: x / Nitrite: x   Leuk Esterase: x / RBC: x / WBC x   Sq Epi: x / Non Sq Epi: x / Bacteria: x    Imaging Personally Reviewed:  - TTE as reported:  1. Left ventricular cavity is normally sized. Left ventricular wall thickness is mildly increased. Left ventricular systolic function is normal with an ejection fraction of 73 % by Alonso's method of disks.  2. Hypertrophic obstructive cardiomyopathy (HOCM). 3. Severe discrete basal septal hypertrophy.   4. The left ventricular outflow tract resting gradient is 108 mmHg and 160 mmHg using the Valsalva maneuver.    Consultant(s) Notes Reviewed: Cardiology  Care Discussed with Consultants/Other Providers: Cardiology Fellow, ASIYA Castro    MEDICATIONS  (STANDING):  benzonatate 100 milliGRAM(s) Oral three times a day  chlorhexidine 2% Cloths 1 Application(s) Topical <User Schedule>  dextrose 5%. 1000 milliLiter(s) (50 mL/Hr) IV Continuous <Continuous>  dextrose 5%. 1000 milliLiter(s) (100 mL/Hr) IV Continuous <Continuous>  dextrose 50% Injectable 12.5 Gram(s) IV Push once  dextrose 50% Injectable 25 Gram(s) IV Push once  dextrose 50% Injectable 25 Gram(s) IV Push once  disopyramide 100 milliGRAM(s) Oral every 8 hours  glucagon  Injectable 1 milliGRAM(s) IntraMuscular once  heparin   Injectable 5000 Unit(s) SubCutaneous three times a day  influenza  Vaccine (HIGH DOSE) 0.7 milliLiter(s) IntraMuscular once  insulin glargine Injectable (LANTUS) 6 Unit(s) SubCutaneous at bedtime  insulin lispro (ADMELOG) corrective regimen sliding scale   SubCutaneous at bedtime  insulin lispro (ADMELOG) corrective regimen sliding scale   SubCutaneous three times a day before meals  insulin lispro Injectable (ADMELOG) 2 Unit(s) SubCutaneous three times a day before meals    MEDICATIONS  (PRN):  benzocaine/menthol Lozenge 1 Lozenge Oral every 4 hours PRN Sore Throat  dextrose Oral Gel 15 Gram(s) Oral once PRN Blood Glucose LESS THAN 70 milliGRAM(s)/deciliter  guaiFENesin Oral Liquid (Sugar-Free) 100 milliGRAM(s) Oral every 6 hours PRN Cough Charlie Torres M.D.  Division of Hospital Medicine  Available on [employer] TEAMS    SUBJECTIVE / ACUTE INTERVAL EVENTS: Patient seen and examined. Tele overnight reportedly w/ bradycardia and Mobitz type 1 AV block. Patient states she is feeling much improved compared to yesterday as cough has been better controlled. She denies CP, palpitations, SOB/CAGE, or have abd pain n/v/d/c. Discussed intermittent bradycardia w/ cardiology, non-sustained, and pt w/o noted associated symptoms -- ok to continue Norpace as initiated overnight. Plan to check QTc tomorrow AM, and if patient remains improved symptomatically re: COVID 19 infection, she can be discharged home w/ close outpatient cardiology f/u for QTc monitoring and repeat TTE. Will discuss this plan with patient and her family.       OBJECTIVE:   Vital Signs Last 24 Hrs  T(F): 98.4 (03 Oct 2023 10:51), Max: 98.9 (02 Oct 2023 21:00)  HR: 88 (03 Oct 2023 04:00) (76 - 95)  BP: 149/81 (03 Oct 2023 04:00) (147/66 - 159/92)  RR: 18 (03 Oct 2023 10:51) (18 - 18)  SpO2: 98% (03 Oct 2023 10:51) (97% - 98%)    Parameters below as of 03 Oct 2023 10:51  Patient On (Oxygen Delivery Method): room air    I&O's Summary  02 Oct 2023 07:01  -  03 Oct 2023 07:00  --------------------------------------------------------  IN: 0 mL / OUT: 1700 mL / NET: -1700 mL    03 Oct 2023 07:01  -  03 Oct 2023 12:27  --------------------------------------------------------  IN: 520 mL / OUT: 400 mL / NET: 120 mL    Physical Examination:  GEN: middle aged woman, ambulating in room in [facility]  PSYCH: A&Ox3, mood and affect appear appropriate   NEURO: no focal neurologic deficits appreciated  RESPI: no accessory muscle use, B/L air entry, CTAB   CARDIO: regular rate/rhythm, no LE edema B/L  ABD: soft, NT, ND  EXT: patient able to move all extremities spontaneously  VASC: peripheral pulses palpated    Labs:  CAPILLARY BLOOD GLUCOSE  POCT Blood Glucose.: 253 mg/dL (03 Oct 2023 11:35)  POCT Blood Glucose.: 194 mg/dL (03 Oct 2023 07:31)  POCT Blood Glucose.: 247 mg/dL (02 Oct 2023 21:46)  POCT Blood Glucose.: 248 mg/dL (02 Oct 2023 17:37)                        9.6    4.08  )-----------( 155      ( 03 Oct 2023 07:04 )             30.0     10-03  138  |  107  |  34<H>  ----------------------------<  218<H>  4.3   |  21<L>  |  1.81<H>    Ca    9.1      03 Oct 2023 06:55  Phos  3.5     10-03  Mg     1.8     10-03    TPro  6.5  /  Alb  3.5  /  TBili  0.3  /  DBili  x   /  AST  13  /  ALT  15  /  AlkPhos  64  10-03    Urinalysis Basic - ( 03 Oct 2023 06:55 )  Color: x / Appearance: x / SG: x / pH: x  Gluc: 218 mg/dL / Ketone: x  / Bili: x / Urobili: x   Blood: x / Protein: x / Nitrite: x   Leuk Esterase: x / RBC: x / WBC x   Sq Epi: x / Non Sq Epi: x / Bacteria: x    Imaging Personally Reviewed:  - TTE as reported:  1. Left ventricular cavity is normally sized. Left ventricular wall thickness is mildly increased. Left ventricular systolic function is normal with an ejection fraction of 73 % by Alonso's method of disks.  2. Hypertrophic obstructive cardiomyopathy (HOCM). 3. Severe discrete basal septal hypertrophy.   4. The left ventricular outflow tract resting gradient is 108 mmHg and 160 mmHg using the Valsalva maneuver.  - CXR as personally interpreted: Clear lungs.    Consultant(s) Notes Reviewed: Cardiology  Care Discussed with Consultants/Other Providers: Cardiology Fellow, ASIYA Castro    MEDICATIONS  (STANDING):  benzonatate 100 milliGRAM(s) Oral three times a day  chlorhexidine 2% Cloths 1 Application(s) Topical <User Schedule>  dextrose 5%. 1000 milliLiter(s) (50 mL/Hr) IV Continuous <Continuous>  dextrose 5%. 1000 milliLiter(s) (100 mL/Hr) IV Continuous <Continuous>  dextrose 50% Injectable 12.5 Gram(s) IV Push once  dextrose 50% Injectable 25 Gram(s) IV Push once  dextrose 50% Injectable 25 Gram(s) IV Push once  disopyramide 100 milliGRAM(s) Oral every 8 hours  glucagon  Injectable 1 milliGRAM(s) IntraMuscular once  heparin   Injectable 5000 Unit(s) SubCutaneous three times a day  influenza  Vaccine (HIGH DOSE) 0.7 milliLiter(s) IntraMuscular once  insulin glargine Injectable (LANTUS) 6 Unit(s) SubCutaneous at bedtime  insulin lispro (ADMELOG) corrective regimen sliding scale   SubCutaneous at bedtime  insulin lispro (ADMELOG) corrective regimen sliding scale   SubCutaneous three times a day before meals  insulin lispro Injectable (ADMELOG) 2 Unit(s) SubCutaneous three times a day before meals    MEDICATIONS  (PRN):  benzocaine/menthol Lozenge 1 Lozenge Oral every 4 hours PRN Sore Throat  dextrose Oral Gel 15 Gram(s) Oral once PRN Blood Glucose LESS THAN 70 milliGRAM(s)/deciliter  guaiFENesin Oral Liquid (Sugar-Free) 100 milliGRAM(s) Oral every 6 hours PRN Cough

## 2023-10-03 NOTE — DISCHARGE NOTE PROVIDER - NSDCMRMEDTOKEN_GEN_ALL_CORE_FT
glimepiride 4 mg oral tablet: 1 tab(s) orally once a day  metFORMIN 500 mg oral tablet: 1 tab(s) orally 2 times a day  pravastatin 20 mg oral tablet: 1 tab(s) orally once a day   acetaminophen 325 mg oral tablet: 2 tab(s) orally every 6 hours As needed Mild Pain (1 - 3)  glimepiride 4 mg oral tablet: 1 tab(s) orally once a day  guaiFENesin 100 mg/5 mL oral liquid: 5 milliliter(s) orally every 6 hours As needed Cough  metFORMIN 500 mg oral tablet: 1 tab(s) orally 2 times a day  metoprolol succinate 25 mg oral tablet, extended release: 1 tab(s) orally once a day  pravastatin 20 mg oral tablet: 1 tab(s) orally once a day   acetaminophen 325 mg oral tablet: 2 tab(s) orally every 6 hours As needed Mild Pain (1 - 3)  glimepiride 4 mg oral tablet: 1 tab(s) orally once a day  guaiFENesin 100 mg/5 mL oral liquid: 5 milliliter(s) orally every 6 hours As needed Cough  metFORMIN 500 mg oral tablet: 1 tab(s) orally 2 times a day  metoprolol succinate 25 mg oral tablet, extended release: 1 tab(s) orally once a day

## 2023-10-03 NOTE — DISCHARGE NOTE PROVIDER - NSDCCPCAREPLAN_GEN_ALL_CORE_FT
PRINCIPAL DISCHARGE DIAGNOSIS  Diagnosis: Dyspnea  Assessment and Plan of Treatment: Your chest x-ray is clear.  You were found to have COVID-19, monitoring off Remdesivir at this time.  Echocardiogram performed on 10/3/23: Left ventricular systolic function is normal with an ejection fraction of 73 % by Alonso's method of disks. Hypertrophic obstructive cardiomyopathy (HOCM). Severe discrete basal septal hypertrophy. The left ventricular outflow tract resting gradient is 108 mmHg and 160 mmHg using the Valsalva maneuver.  Initiated Disopyrimide per cardiology's recommendations. Discontinued on 10/4 and started on Metoprolol.  Plan for repeat TTE as outpatient per cardiology, w/ Dr. Tyler in clinic next week.  VQ negative for PE.  Follow up with cardiology within 1 week.      SECONDARY DISCHARGE DIAGNOSES  Diagnosis: HOCM (hypertrophic obstructive cardiomyopathy)  Assessment and Plan of Treatment: Follow up with Dr. Tyler in clinic late next week for repeat echocardiogram and to ensure follow up with cardiology.    Diagnosis: Hypotension  Assessment and Plan of Treatment: Resolved  Orthostatic vital signs negative.    Diagnosis: LOPEZ (acute kidney injury)  Assessment and Plan of Treatment: Avoid taking (NSAIDs) - (ex: Ibuprofen, Advil, Celebrex, Naprosyn)  Avoid taking any nephrotoxic agents (can harm kidneys) - Intravenous contrast for diagnostic testing, combination cold medications.  Have all medications adjusted for your renal function by your Health Care Provider.  Blood pressure control is important.  Take all medication as prescribed.    Diagnosis: 2019 novel coronavirus disease (COVID-19)  Assessment and Plan of Treatment: You tested positive for COVID 19.  You no longer require hospitalization.  Please restrict activities outside of your home except for getting medical care.  Do not go to work, school, or public areas.  Avoid using public transportation, ride-sharing, or taxis.  Separate yourself from other people and animals in your home.  Call ahead before visiting your doctor.  Wear a facemask when you are around other people. Cover your cough and sneezes.  Clean your hands often.  Avoid sharing personal household items.  Clean all frequently touched surfaces daily.     PRINCIPAL DISCHARGE DIAGNOSIS  Diagnosis: Dyspnea  Assessment and Plan of Treatment: Your chest x-ray is clear.  You were found to have COVID-19, monitoring off Remdesivir at this time.  Echocardiogram performed on 10/3/23: Left ventricular systolic function is normal with an ejection fraction of 73 % by Alonso's method of disks. Hypertrophic obstructive cardiomyopathy (HOCM). Severe discrete basal septal hypertrophy. The left ventricular outflow tract resting gradient is 108 mmHg and 160 mmHg using the Valsalva maneuver.  Initiated Disopyrimide per cardiology's recommendations. Discontinued on 10/4 and started on Metoprolol.  Plan for repeat TTE as outpatient per cardiology, w/ Dr. Tyler in clinic next week.  VQ negative for PE.  Follow-up with Cardiology (Dr. Tyler) on 10/13/23 at 3pm to monitor HOCM, AV Block, and to plan for treadmill stress testing  - Follow-up with Primary Care Physician within 5-7 days of discharge to monitor blood sugar / finger sticks for diabetes mellitus      SECONDARY DISCHARGE DIAGNOSES  Diagnosis: HOCM (hypertrophic obstructive cardiomyopathy)  Assessment and Plan of Treatment: Follow up with Dr. Tyler in clinic late next week for repeat echocardiogram and to ensure follow up with cardiology.  Follow-up with Cardiology (Dr. Tyler) on 10/13/23 at 3pm to monitor HOCM, AV Block, and to plan for treadmill stress testing.    Diagnosis: Hypotension  Assessment and Plan of Treatment: Resolved  Orthostatic vital signs negative.    Diagnosis: LOPEZ (acute kidney injury)  Assessment and Plan of Treatment: Avoid taking (NSAIDs) - (ex: Ibuprofen, Advil, Celebrex, Naprosyn)  Avoid taking any nephrotoxic agents (can harm kidneys) - Intravenous contrast for diagnostic testing, combination cold medications.  Have all medications adjusted for your renal function by your Health Care Provider.  Blood pressure control is important.  Take all medication as prescribed.    Diagnosis: 2019 novel coronavirus disease (COVID-19)  Assessment and Plan of Treatment: You tested positive for COVID 19.  You no longer require hospitalization.  Please restrict activities outside of your home except for getting medical care.  Do not go to work, school, or public areas.  Avoid using public transportation, ride-sharing, or taxis.  Separate yourself from other people and animals in your home.  Call ahead before visiting your doctor.  Wear a facemask when you are around other people. Cover your cough and sneezes.  Clean your hands often.  Avoid sharing personal household items.  Clean all frequently touched surfaces daily.

## 2023-10-03 NOTE — DISCHARGE NOTE PROVIDER - CARE PROVIDER_API CALL
Raimundo Tyler  Cardiology  [address] [place] [address] Drive, [address] [place] [id number]  Phone: [phone]  Fax: [phone]  Follow Up Time: 1 week   Raimundo Tyler  Cardiology  [address] [place] [address] Drive, [address] [place] [id number]  Phone: [phone]  Fax: [phone]  Follow Up Time: 1 week    Giancarlo Brush  Internal Medicine  [address] [place] [address]  Phone: [phone]  Fax: [phone]  Follow Up Time: 1 week   Raimundo Tyler  Cardiology  [address] [place] [address], [address] [place] [id number]  Phone: [phone]  Fax: [phone]  Scheduled Appointment: 10/13/2023 03:00 PM    Giancarlo Brush  Internal Medicine  [address] [place] [address]  Phone: [phone]  Fax: [phone]  Follow Up Time: 1 week

## 2023-10-03 NOTE — DISCHARGE NOTE PROVIDER - CARE PROVIDERS DIRECT ADDRESSES
,[email or web address]_Unknown

## 2023-10-03 NOTE — DISCHARGE NOTE PROVIDER - NSDCFUSCHEDAPPT_GEN_ALL_CORE_FT
Raimundo Tyler Physician [facility]  CARDIOLOGY [address]  Scheduled Appointment: 10/13/2023

## 2023-10-04 LAB
ANION GAP SERPL CALC-SCNC: 11 MMOL/L — SIGNIFICANT CHANGE UP (ref 5–17)
ANION GAP SERPL CALC-SCNC: 15 MMOL/L — SIGNIFICANT CHANGE UP (ref 5–17)
BUN SERPL-MCNC: 38 MG/DL — HIGH (ref 7–23)
BUN SERPL-MCNC: 38 MG/DL — HIGH (ref 7–23)
CALCIUM SERPL-MCNC: 9.4 MG/DL — SIGNIFICANT CHANGE UP (ref 8.4–10.5)
CALCIUM SERPL-MCNC: 9.6 MG/DL — SIGNIFICANT CHANGE UP (ref 8.4–10.5)
CHLORIDE SERPL-SCNC: 106 MMOL/L — SIGNIFICANT CHANGE UP (ref 96–108)
CHLORIDE SERPL-SCNC: 108 MMOL/L — SIGNIFICANT CHANGE UP (ref 96–108)
CO2 SERPL-SCNC: 17 MMOL/L — LOW (ref 22–31)
CO2 SERPL-SCNC: 18 MMOL/L — LOW (ref 22–31)
CREAT SERPL-MCNC: 1.92 MG/DL — HIGH (ref 0.5–1.3)
CREAT SERPL-MCNC: 2.06 MG/DL — HIGH (ref 0.5–1.3)
CRP SERPL-MCNC: 16 MG/L — HIGH (ref 0–4)
D DIMER BLD IA.RAPID-MCNC: 153 NG/ML DDU — SIGNIFICANT CHANGE UP
EGFR: 26 ML/MIN/1.73M2 — LOW
EGFR: 28 ML/MIN/1.73M2 — LOW
FERRITIN SERPL-MCNC: 202 NG/ML — SIGNIFICANT CHANGE UP (ref 13–330)
GLUCOSE BLDC GLUCOMTR-MCNC: 206 MG/DL — HIGH (ref 70–99)
GLUCOSE BLDC GLUCOMTR-MCNC: 228 MG/DL — HIGH (ref 70–99)
GLUCOSE BLDC GLUCOMTR-MCNC: 274 MG/DL — HIGH (ref 70–99)
GLUCOSE BLDC GLUCOMTR-MCNC: 275 MG/DL — HIGH (ref 70–99)
GLUCOSE SERPL-MCNC: 215 MG/DL — HIGH (ref 70–99)
GLUCOSE SERPL-MCNC: 262 MG/DL — HIGH (ref 70–99)
HCT VFR BLD CALC: 32.3 % — LOW (ref 34.5–45)
HGB BLD-MCNC: 10.1 G/DL — LOW (ref 11.5–15.5)
MAGNESIUM SERPL-MCNC: 1.8 MG/DL — SIGNIFICANT CHANGE UP (ref 1.6–2.6)
MAGNESIUM SERPL-MCNC: 1.8 MG/DL — SIGNIFICANT CHANGE UP (ref 1.6–2.6)
MCHC RBC-ENTMCNC: 26.6 PG — LOW (ref 27–34)
MCHC RBC-ENTMCNC: 31.3 GM/DL — LOW (ref 32–36)
MCV RBC AUTO: 85 FL — SIGNIFICANT CHANGE UP (ref 80–100)
NRBC # BLD: 0 /100 WBCS — SIGNIFICANT CHANGE UP (ref 0–0)
PHOSPHATE SERPL-MCNC: 3.2 MG/DL — SIGNIFICANT CHANGE UP (ref 2.5–4.5)
PHOSPHATE SERPL-MCNC: 3.8 MG/DL — SIGNIFICANT CHANGE UP (ref 2.5–4.5)
PLATELET # BLD AUTO: 182 K/UL — SIGNIFICANT CHANGE UP (ref 150–400)
POTASSIUM SERPL-MCNC: 4.4 MMOL/L — SIGNIFICANT CHANGE UP (ref 3.5–5.3)
POTASSIUM SERPL-MCNC: 4.8 MMOL/L — SIGNIFICANT CHANGE UP (ref 3.5–5.3)
POTASSIUM SERPL-SCNC: 4.4 MMOL/L — SIGNIFICANT CHANGE UP (ref 3.5–5.3)
POTASSIUM SERPL-SCNC: 4.8 MMOL/L — SIGNIFICANT CHANGE UP (ref 3.5–5.3)
RBC # BLD: 3.8 M/UL — SIGNIFICANT CHANGE UP (ref 3.8–5.2)
RBC # FLD: 13.8 % — SIGNIFICANT CHANGE UP (ref 10.3–14.5)
SODIUM SERPL-SCNC: 137 MMOL/L — SIGNIFICANT CHANGE UP (ref 135–145)
SODIUM SERPL-SCNC: 138 MMOL/L — SIGNIFICANT CHANGE UP (ref 135–145)
WBC # BLD: 4.73 K/UL — SIGNIFICANT CHANGE UP (ref 3.8–10.5)
WBC # FLD AUTO: 4.73 K/UL — SIGNIFICANT CHANGE UP (ref 3.8–10.5)

## 2023-10-04 PROCEDURE — 99233 SBSQ HOSP IP/OBS HIGH 50: CPT

## 2023-10-04 PROCEDURE — 93010 ELECTROCARDIOGRAM REPORT: CPT

## 2023-10-04 RX ORDER — METOPROLOL TARTRATE 50 MG
1 TABLET ORAL
Qty: 30 | Refills: 0
Start: 2023-10-04 | End: 2023-11-02

## 2023-10-04 RX ORDER — SODIUM CHLORIDE 9 MG/ML
500 INJECTION INTRAMUSCULAR; INTRAVENOUS; SUBCUTANEOUS
Refills: 0 | Status: COMPLETED | OUTPATIENT
Start: 2023-10-04 | End: 2023-10-04

## 2023-10-04 RX ORDER — METOPROLOL TARTRATE 50 MG
25 TABLET ORAL DAILY
Refills: 0 | Status: DISCONTINUED | OUTPATIENT
Start: 2023-10-04 | End: 2023-10-05

## 2023-10-04 RX ORDER — INSULIN LISPRO 100/ML
3 VIAL (ML) SUBCUTANEOUS
Refills: 0 | Status: DISCONTINUED | OUTPATIENT
Start: 2023-10-04 | End: 2023-10-05

## 2023-10-04 RX ORDER — INSULIN GLARGINE 100 [IU]/ML
9 INJECTION, SOLUTION SUBCUTANEOUS AT BEDTIME
Refills: 0 | Status: DISCONTINUED | OUTPATIENT
Start: 2023-10-04 | End: 2023-10-05

## 2023-10-04 RX ORDER — ACETAMINOPHEN 500 MG
2 TABLET ORAL
Qty: 0 | Refills: 0 | DISCHARGE
Start: 2023-10-04

## 2023-10-04 RX ORDER — INSULIN GLARGINE 100 [IU]/ML
8 INJECTION, SOLUTION SUBCUTANEOUS AT BEDTIME
Refills: 0 | Status: DISCONTINUED | OUTPATIENT
Start: 2023-10-04 | End: 2023-10-04

## 2023-10-04 RX ADMIN — HEPARIN SODIUM 5000 UNIT(S): 5000 INJECTION INTRAVENOUS; SUBCUTANEOUS at 13:03

## 2023-10-04 RX ADMIN — Medication 100 MILLIGRAM(S): at 22:03

## 2023-10-04 RX ADMIN — Medication 100 MILLIGRAM(S): at 05:22

## 2023-10-04 RX ADMIN — CHLORHEXIDINE GLUCONATE 1 APPLICATION(S): 213 SOLUTION TOPICAL at 05:22

## 2023-10-04 RX ADMIN — Medication 2 UNIT(S): at 07:53

## 2023-10-04 RX ADMIN — Medication 3 UNIT(S): at 18:09

## 2023-10-04 RX ADMIN — Medication 3: at 11:49

## 2023-10-04 RX ADMIN — INSULIN GLARGINE 9 UNIT(S): 100 INJECTION, SOLUTION SUBCUTANEOUS at 22:03

## 2023-10-04 RX ADMIN — Medication 2: at 07:52

## 2023-10-04 RX ADMIN — Medication 2: at 18:08

## 2023-10-04 RX ADMIN — Medication 100 MILLIGRAM(S): at 13:03

## 2023-10-04 RX ADMIN — HEPARIN SODIUM 5000 UNIT(S): 5000 INJECTION INTRAVENOUS; SUBCUTANEOUS at 22:03

## 2023-10-04 RX ADMIN — SODIUM CHLORIDE 100 MILLILITER(S): 9 INJECTION INTRAMUSCULAR; INTRAVENOUS; SUBCUTANEOUS at 13:03

## 2023-10-04 RX ADMIN — HEPARIN SODIUM 5000 UNIT(S): 5000 INJECTION INTRAVENOUS; SUBCUTANEOUS at 05:22

## 2023-10-04 RX ADMIN — Medication 1: at 22:03

## 2023-10-04 RX ADMIN — Medication 2 UNIT(S): at 11:50

## 2023-10-04 NOTE — PROGRESS NOTE ADULT - SUBJECTIVE AND OBJECTIVE BOX
INCOMPLETE NOTE    Charlie Torres M.D.  Division of Hospital Medicine  Available on [employer] TEAMS    SUBJECTIVE / ACUTE INTERVAL EVENTS: Patient seen and examined. No overnight events. No subjective complaints.     OBJECTIVE:   Vital Signs Last 24 Hrs  T(F): 98.1 (04 Oct 2023 05:11), Max: 98.5 (03 Oct 2023 20:38)  HR: 59 (04 Oct 2023 05:11) (59 - 83)  BP: 157/82 (04 Oct 2023 05:11) (145/83 - 161/85)  RR: 18 (04 Oct 2023 05:11) (17 - 18)  SpO2: 98% (04 Oct 2023 05:11) (96% - 98%)  Parameters below as of 04 Oct 2023 05:11  Patient On (Oxygen Delivery Method): room air    I&O's Summary  03 Oct 2023 07:01  -  04 Oct 2023 07:00  --------------------------------------------------------  IN: 520 mL / OUT: 400 mL / NET: 120 mL    Physical Examination:  GEN: middle aged woman, sitting up in bed in NAD  PSYCH: A&Ox3, mood and affect appear appropriate   NEURO: no focal neurologic deficits appreciated  RESPI: no accessory muscle use, B/L air entry, CTAB   CARDIO: regular rate/rhythm, no LE edema B/L  ABD: soft, NT, ND  EXT: patient able to move all extremities spontaneously  VASC: peripheral pulses palpated    Telemetry:     Labs:  CAPILLARY BLOOD GLUCOSE  POCT Blood Glucose.: 228 mg/dL (04 Oct 2023 07:40)  POCT Blood Glucose.: 336 mg/dL (03 Oct 2023 21:53)  POCT Blood Glucose.: 209 mg/dL (03 Oct 2023 17:42)  POCT Blood Glucose.: 253 mg/dL (03 Oct 2023 11:35)                          10.1   4.73  )-----------( 182      ( 04 Oct 2023 07:05 )             32.3     10-03  138  |  107  |  34<H>  ----------------------------<  218<H>  4.3   |  21<L>  |  1.81<H>    Ca    9.1      03 Oct 2023 06:55  Phos  3.5     10-03  Mg     1.8     10-03    TPro  6.5  /  Alb  3.5  /  TBili  0.3  /  DBili  x   /  AST  13  /  ALT  15  /  AlkPhos  64  10-03    Urinalysis Basic - ( 03 Oct 2023 06:55 )  Color: x / Appearance: x / SG: x / pH: x  Gluc: 218 mg/dL / Ketone: x  / Bili: x / Urobili: x   Blood: x / Protein: x / Nitrite: x   Leuk Esterase: x / RBC: x / WBC x   Sq Epi: x / Non Sq Epi: x / Bacteria: x    Imaging Personally Reviewed:  - ECG:    Consultant(s) Notes Reviewed: Cardiology  Care Discussed with Consultants/Other Providers: Cardiology, ACP Lexi Mccormick    MEDICATIONS  (STANDING):  benzonatate 100 milliGRAM(s) Oral three times a day  chlorhexidine 2% Cloths 1 Application(s) Topical <User Schedule>  dextrose 5%. 1000 milliLiter(s) (50 mL/Hr) IV Continuous <Continuous>  dextrose 5%. 1000 milliLiter(s) (100 mL/Hr) IV Continuous <Continuous>  dextrose 50% Injectable 12.5 Gram(s) IV Push once  dextrose 50% Injectable 25 Gram(s) IV Push once  dextrose 50% Injectable 25 Gram(s) IV Push once  disopyramide 100 milliGRAM(s) Oral every 8 hours  glucagon  Injectable 1 milliGRAM(s) IntraMuscular once  heparin   Injectable 5000 Unit(s) SubCutaneous three times a day  influenza  Vaccine (HIGH DOSE) 0.7 milliLiter(s) IntraMuscular once  insulin glargine Injectable (LANTUS) 6 Unit(s) SubCutaneous at bedtime  insulin lispro (ADMELOG) corrective regimen sliding scale   SubCutaneous at bedtime  insulin lispro (ADMELOG) corrective regimen sliding scale   SubCutaneous three times a day before meals  insulin lispro Injectable (ADMELOG) 2 Unit(s) SubCutaneous three times a day before meals    MEDICATIONS  (PRN):  acetaminophen     Tablet .. 650 milliGRAM(s) Oral every 6 hours PRN Mild Pain (1 - 3)  benzocaine/menthol Lozenge 1 Lozenge Oral every 4 hours PRN Sore Throat  dextrose Oral Gel 15 Gram(s) Oral once PRN Blood Glucose LESS THAN 70 milliGRAM(s)/deciliter  guaiFENesin Oral Liquid (Sugar-Free) 100 milliGRAM(s) Oral every 6 hours PRN Cough   INCOMPLETE NOTE    Charlie Torres M.D.  Division of Hospital Medicine  Available on [employer] TEAMS    SUBJECTIVE / ACUTE INTERVAL EVENTS: Patient seen and examined. No overnight events. No subjective complaints. QTC reading as elevated at 543 on ECG this AM, but per discussion with cardiology fellow, Dr. Cavazos: machine reading it as prolonged because of known 2:1 block, but QTC as measured, is WNL. Cr uptrended this AM as well.     OBJECTIVE:   Vital Signs Last 24 Hrs  T(F): 98.1 (04 Oct 2023 05:11), Max: 98.5 (03 Oct 2023 20:38)  HR: 59 (04 Oct 2023 05:11) (59 - 83)  BP: 157/82 (04 Oct 2023 05:11) (145/83 - 161/85)  RR: 18 (04 Oct 2023 05:11) (17 - 18)  SpO2: 98% (04 Oct 2023 05:11) (96% - 98%)  Parameters below as of 04 Oct 2023 05:11  Patient On (Oxygen Delivery Method): room air    I&O's Summary  03 Oct 2023 07:01  -  04 Oct 2023 07:00  --------------------------------------------------------  IN: 520 mL / OUT: 400 mL / NET: 120 mL    Physical Examination:  GEN: middle aged woman, sitting up in bed in NAD  PSYCH: A&Ox3, mood and affect appear appropriate   NEURO: no focal neurologic deficits appreciated  RESPI: no accessory muscle use, B/L air entry, CTAB   CARDIO: regular rate/rhythm, no LE edema B/L  ABD: soft, NT, ND  EXT: patient able to move all extremities spontaneously  VASC: peripheral pulses palpated    Telemetry:     Labs:  CAPILLARY BLOOD GLUCOSE  POCT Blood Glucose.: 228 mg/dL (04 Oct 2023 07:40)  POCT Blood Glucose.: 336 mg/dL (03 Oct 2023 21:53)  POCT Blood Glucose.: 209 mg/dL (03 Oct 2023 17:42)  POCT Blood Glucose.: 253 mg/dL (03 Oct 2023 11:35)                          10.1   4.73  )-----------( 182      ( 04 Oct 2023 07:05 )             32.3     10-03  138  |  107  |  34<H>  ----------------------------<  218<H>  4.3   |  21<L>  |  1.81<H>    Ca    9.1      03 Oct 2023 06:55  Phos  3.5     10-03  Mg     1.8     10-03    TPro  6.5  /  Alb  3.5  /  TBili  0.3  /  DBili  x   /  AST  13  /  ALT  15  /  AlkPhos  64  10-03    Urinalysis Basic - ( 03 Oct 2023 06:55 )  Color: x / Appearance: x / SG: x / pH: x  Gluc: 218 mg/dL / Ketone: x  / Bili: x / Urobili: x   Blood: x / Protein: x / Nitrite: x   Leuk Esterase: x / RBC: x / WBC x   Sq Epi: x / Non Sq Epi: x / Bacteria: x    Imaging Personally Reviewed:  - ECG:    Consultant(s) Notes Reviewed: Cardiology  Care Discussed with Consultants/Other Providers: Cardiology, ACP Lexi Mccormick    MEDICATIONS  (STANDING):  benzonatate 100 milliGRAM(s) Oral three times a day  chlorhexidine 2% Cloths 1 Application(s) Topical <User Schedule>  dextrose 5%. 1000 milliLiter(s) (50 mL/Hr) IV Continuous <Continuous>  dextrose 5%. 1000 milliLiter(s) (100 mL/Hr) IV Continuous <Continuous>  dextrose 50% Injectable 12.5 Gram(s) IV Push once  dextrose 50% Injectable 25 Gram(s) IV Push once  dextrose 50% Injectable 25 Gram(s) IV Push once  disopyramide 100 milliGRAM(s) Oral every 8 hours  glucagon  Injectable 1 milliGRAM(s) IntraMuscular once  heparin   Injectable 5000 Unit(s) SubCutaneous three times a day  influenza  Vaccine (HIGH DOSE) 0.7 milliLiter(s) IntraMuscular once  insulin glargine Injectable (LANTUS) 6 Unit(s) SubCutaneous at bedtime  insulin lispro (ADMELOG) corrective regimen sliding scale   SubCutaneous at bedtime  insulin lispro (ADMELOG) corrective regimen sliding scale   SubCutaneous three times a day before meals  insulin lispro Injectable (ADMELOG) 2 Unit(s) SubCutaneous three times a day before meals    MEDICATIONS  (PRN):  acetaminophen     Tablet .. 650 milliGRAM(s) Oral every 6 hours PRN Mild Pain (1 - 3)  benzocaine/menthol Lozenge 1 Lozenge Oral every 4 hours PRN Sore Throat  dextrose Oral Gel 15 Gram(s) Oral once PRN Blood Glucose LESS THAN 70 milliGRAM(s)/deciliter  guaiFENesin Oral Liquid (Sugar-Free) 100 milliGRAM(s) Oral every 6 hours PRN Cough Charlie Torres M.D.  Division of Hospital Medicine  Available on [employer] TEAMS    SUBJECTIVE / ACUTE INTERVAL EVENTS: Patient seen and examined. No overnight events. No subjective complaints. QTC reading as elevated at 543 on ECG this AM, but per discussion with cardiology fellow, Dr. Cavazos: machine reading it as prolonged because of known 2:1 block, but QTC as measured, is WNL. Cr uptrended this AM as well.     UPDATE: Per cardiology: Pt had been started on disopyramide 100mg TID iso HOCM however QTc appears slightly prolonged, ~half the R-R interval on EKG, however difficult to interpret iso 1:2 AV block. QTc measuring by manual correction ~480ms. Spoke w/ Dr. Tyler who felt we should stop disopyramide at this time and start low dose metop, and plan for f/up in the outpt.    OBJECTIVE:   Vital Signs Last 24 Hrs  T(F): 98.1 (04 Oct 2023 05:11), Max: 98.5 (03 Oct 2023 20:38)  HR: 59 (04 Oct 2023 05:11) (59 - 83)  BP: 157/82 (04 Oct 2023 05:11) (145/83 - 161/85)  RR: 18 (04 Oct 2023 05:11) (17 - 18)  SpO2: 98% (04 Oct 2023 05:11) (96% - 98%)  Parameters below as of 04 Oct 2023 05:11  Patient On (Oxygen Delivery Method): room air    I&O's Summary  03 Oct 2023 07:01  -  04 Oct 2023 07:00  --------------------------------------------------------  IN: 520 mL / OUT: 400 mL / NET: 120 mL    Physical Examination:  GEN: middle aged woman, sitting up in bed in NAD  PSYCH: A&Ox3, mood and affect appear appropriate   NEURO: no focal neurologic deficits appreciated  RESPI: no accessory muscle use, B/L air entry, CTAB   CARDIO: regular rate/rhythm, no LE edema B/L  ABD: soft, NT, ND  EXT: patient able to move all extremities spontaneously  VASC: peripheral pulses palpated    Telemetry: 2:1 AVB 50-60bpm    Labs:  CAPILLARY BLOOD GLUCOSE  POCT Blood Glucose.: 228 mg/dL (04 Oct 2023 07:40)  POCT Blood Glucose.: 336 mg/dL (03 Oct 2023 21:53)  POCT Blood Glucose.: 209 mg/dL (03 Oct 2023 17:42)  POCT Blood Glucose.: 253 mg/dL (03 Oct 2023 11:35)                        10.1   4.73  )-----------( 182      ( 04 Oct 2023 07:05 )             32.3     10-03  138  |  107  |  34<H>  ----------------------------<  218<H>  4.3   |  21<L>  |  1.81<H>    Ca    9.1      03 Oct 2023 06:55  Phos  3.5     10-03  Mg     1.8     10-03    TPro  6.5  /  Alb  3.5  /  TBili  0.3  /  DBili  x   /  AST  13  /  ALT  15  /  AlkPhos  64  10-03    Urinalysis Basic - ( 03 Oct 2023 06:55 )  Color: x / Appearance: x / SG: x / pH: x  Gluc: 218 mg/dL / Ketone: x  / Bili: x / Urobili: x   Blood: x / Protein: x / Nitrite: x   Leuk Esterase: x / RBC: x / WBC x   Sq Epi: x / Non Sq Epi: x / Bacteria: x    Consultant(s) Notes Reviewed: Cardiology  Care Discussed with Consultants/Other Providers: Cardiology, ACP Lexi Mccormick    MEDICATIONS  (STANDING):  benzonatate 100 milliGRAM(s) Oral three times a day  chlorhexidine 2% Cloths 1 Application(s) Topical <User Schedule>  dextrose 5%. 1000 milliLiter(s) (50 mL/Hr) IV Continuous <Continuous>  dextrose 5%. 1000 milliLiter(s) (100 mL/Hr) IV Continuous <Continuous>  dextrose 50% Injectable 12.5 Gram(s) IV Push once  dextrose 50% Injectable 25 Gram(s) IV Push once  dextrose 50% Injectable 25 Gram(s) IV Push once  disopyramide 100 milliGRAM(s) Oral every 8 hours  glucagon  Injectable 1 milliGRAM(s) IntraMuscular once  heparin   Injectable 5000 Unit(s) SubCutaneous three times a day  influenza  Vaccine (HIGH DOSE) 0.7 milliLiter(s) IntraMuscular once  insulin glargine Injectable (LANTUS) 6 Unit(s) SubCutaneous at bedtime  insulin lispro (ADMELOG) corrective regimen sliding scale   SubCutaneous at bedtime  insulin lispro (ADMELOG) corrective regimen sliding scale   SubCutaneous three times a day before meals  insulin lispro Injectable (ADMELOG) 2 Unit(s) SubCutaneous three times a day before meals    MEDICATIONS  (PRN):  acetaminophen     Tablet .. 650 milliGRAM(s) Oral every 6 hours PRN Mild Pain (1 - 3)  benzocaine/menthol Lozenge 1 Lozenge Oral every 4 hours PRN Sore Throat  dextrose Oral Gel 15 Gram(s) Oral once PRN Blood Glucose LESS THAN 70 milliGRAM(s)/deciliter  guaiFENesin Oral Liquid (Sugar-Free) 100 milliGRAM(s) Oral every 6 hours PRN Cough

## 2023-10-04 NOTE — PROGRESS NOTE ADULT - SUBJECTIVE AND OBJECTIVE BOX
[facility] Division of Kidney Diseases & Hypertension  FOLLOW UP NOTE  [phone]--------------------------------------------------------------------------------    Chief Complaint: LOPEZ    24 hour events/subjective:  sob much improved. Patient sitting up comfortably. Denies any headaches, fevers/chills, chest pain, palpitations, SOB, and leg swelling.    PAST HISTORY  --------------------------------------------------------------------------------  No significant changes to PMH, PSH, FHx, SHx, unless otherwise noted    ALLERGIES & MEDICATIONS  --------------------------------------------------------------------------------  Allergies    No Known Allergies    Intolerances      Standing Inpatient Medications  benzonatate 100 milliGRAM(s) Oral three times a day  chlorhexidine 2% Cloths 1 Application(s) Topical <User Schedule>  dextrose 5%. 1000 milliLiter(s) IV Continuous <Continuous>  dextrose 5%. 1000 milliLiter(s) IV Continuous <Continuous>  dextrose 50% Injectable 12.5 Gram(s) IV Push once  dextrose 50% Injectable 25 Gram(s) IV Push once  dextrose 50% Injectable 25 Gram(s) IV Push once  glucagon  Injectable 1 milliGRAM(s) IntraMuscular once  heparin   Injectable 5000 Unit(s) SubCutaneous three times a day  influenza  Vaccine (HIGH DOSE) 0.7 milliLiter(s) IntraMuscular once  insulin glargine Injectable (LANTUS) 9 Unit(s) SubCutaneous at bedtime  insulin lispro (ADMELOG) corrective regimen sliding scale   SubCutaneous three times a day before meals  insulin lispro (ADMELOG) corrective regimen sliding scale   SubCutaneous at bedtime  insulin lispro Injectable (ADMELOG) 3 Unit(s) SubCutaneous three times a day before meals  metoprolol succinate ER 25 milliGRAM(s) Oral daily    PRN Inpatient Medications  acetaminophen     Tablet .. 650 milliGRAM(s) Oral every 6 hours PRN  benzocaine/menthol Lozenge 1 Lozenge Oral every 4 hours PRN  dextrose Oral Gel 15 Gram(s) Oral once PRN  guaiFENesin Oral Liquid (Sugar-Free) 100 milliGRAM(s) Oral every 6 hours PRN      REVIEW OF SYSTEMS  --------------------------------------------------------------------------------  per above     VITALS/PHYSICAL EXAM  --------------------------------------------------------------------------------  T(C): 36.3 (10-04-23 @ 11:42), Max: 36.9 (10-03-23 @ 20:38)  HR: 62 (10-04-23 @ 16:14) (59 - 80)  BP: 148/79 (10-04-23 @ 16:14) (136/86 - 161/85)  RR: 18 (10-04-23 @ 16:14) (17 - 18)  SpO2: 100% (10-04-23 @ 16:14) (96% - 100%)  Wt(kg): --        10-03-23 @ 07:01  -  10-04-23 @ 07:00  --------------------------------------------------------  IN: 520 mL / OUT: 400 mL / NET: 120 mL    10-04-23 @ 07:01  -  10-04-23 @ 16:52  --------------------------------------------------------  IN: 480 mL / OUT: 800 mL / NET: -320 mL      Physical Exam:  General: no acute distress  Neuro: no focal deficits  HEENT: anicteric, +JVD  Pulmonary: CTA b/l   Cardiovascular/Chest: +S1S2, Pt has murmurs at apex, in aortic area and Pulm area.   GI/Abdomen: soft, non-distended, non-tender, +bowel sounds  Extremities: No LE edema   Skin: Warm and dry      LABS/STUDIES  --------------------------------------------------------------------------------              10.1   4.73  >-----------<  182      [10-04-23 @ 07:05]              32.3     138  |  106  |  38  ----------------------------<  262      [10-04-23 @ 07:08]  4.4   |  17  |  2.06        Ca     9.4     [10-04-23 @ 07:08]      Mg     1.8     [10-04-23 @ 07:08]      Phos  3.8     [10-04-23 @ 07:08]    TPro  6.5  /  Alb  3.5  /  TBili  0.3  /  DBili  x   /  AST  13  /  ALT  15  /  AlkPhos  64  [10-03-23 @ 06:55]          Creatinine Trend:  SCr 2.06 [10-04 @ 07:08]  SCr 1.81 [10-03 @ 06:55]  SCr 1.93 [10-02 @ 06:45]  SCr 1.94 [10-01 @ 06:04]  SCr 2.00 [09-30 @ 07:02]    Urinalysis - [10-04-23 @ 07:08]      Color  / Appearance  / SG  / pH       Gluc 262 / Ketone   / Bili  / Urobili        Blood  / Protein  / Leuk Est  / Nitrite       RBC  / WBC  / Hyaline  / Gran  / Sq Epi  / Non Sq Epi  / Bacteria     Urine Creatinine 110      [09-28-23 @ 17:35]  Urine Protein 14      [09-28-23 @ 17:35]  Urine Sodium 55      [09-28-23 @ 17:35]  Urine Potassium 27      [09-28-23 @ 17:35]  Urine Chloride 37      [09-28-23 @ 17:35]    Ferritin 202      [10-04-23 @ 07:05]  TSH 3.17      [09-28-23 @ 07:24]    HCV 0.10, Nonreact      [09-28-23 @ 07:24]

## 2023-10-05 ENCOUNTER — TRANSCRIPTION ENCOUNTER (OUTPATIENT)
Age: 68
End: 2023-10-05

## 2023-10-05 ENCOUNTER — NON-APPOINTMENT (OUTPATIENT)
Age: 68
End: 2023-10-05

## 2023-10-05 VITALS
OXYGEN SATURATION: 100 % | HEART RATE: 52 BPM | DIASTOLIC BLOOD PRESSURE: 80 MMHG | SYSTOLIC BLOOD PRESSURE: 155 MMHG | RESPIRATION RATE: 18 BRPM

## 2023-10-05 PROBLEM — E11.9 TYPE 2 DIABETES MELLITUS WITHOUT COMPLICATIONS: Chronic | Status: ACTIVE | Noted: 2023-09-27

## 2023-10-05 PROBLEM — I10 ESSENTIAL (PRIMARY) HYPERTENSION: Chronic | Status: ACTIVE | Noted: 2023-09-27

## 2023-10-05 LAB
GLUCOSE BLDC GLUCOMTR-MCNC: 193 MG/DL — HIGH (ref 70–99)
GLUCOSE BLDC GLUCOMTR-MCNC: 261 MG/DL — HIGH (ref 70–99)
GLUCOSE BLDC GLUCOMTR-MCNC: 265 MG/DL — HIGH (ref 70–99)
GLUCOSE BLDC GLUCOMTR-MCNC: 280 MG/DL — HIGH (ref 70–99)

## 2023-10-05 PROCEDURE — 82533 TOTAL CORTISOL: CPT

## 2023-10-05 PROCEDURE — 80053 COMPREHEN METABOLIC PANEL: CPT

## 2023-10-05 PROCEDURE — 82803 BLOOD GASES ANY COMBINATION: CPT

## 2023-10-05 PROCEDURE — 86803 HEPATITIS C AB TEST: CPT

## 2023-10-05 PROCEDURE — 84443 ASSAY THYROID STIM HORMONE: CPT

## 2023-10-05 PROCEDURE — 82962 GLUCOSE BLOOD TEST: CPT

## 2023-10-05 PROCEDURE — 71045 X-RAY EXAM CHEST 1 VIEW: CPT

## 2023-10-05 PROCEDURE — 85610 PROTHROMBIN TIME: CPT

## 2023-10-05 PROCEDURE — 85652 RBC SED RATE AUTOMATED: CPT

## 2023-10-05 PROCEDURE — 71250 CT THORAX DX C-: CPT

## 2023-10-05 PROCEDURE — 84145 PROCALCITONIN (PCT): CPT

## 2023-10-05 PROCEDURE — 83735 ASSAY OF MAGNESIUM: CPT

## 2023-10-05 PROCEDURE — 86901 BLOOD TYPING SEROLOGIC RH(D): CPT

## 2023-10-05 PROCEDURE — 86618 LYME DISEASE ANTIBODY: CPT

## 2023-10-05 PROCEDURE — 99239 HOSP IP/OBS DSCHRG MGMT >30: CPT

## 2023-10-05 PROCEDURE — 86140 C-REACTIVE PROTEIN: CPT

## 2023-10-05 PROCEDURE — 76770 US EXAM ABDO BACK WALL COMP: CPT

## 2023-10-05 PROCEDURE — 83605 ASSAY OF LACTIC ACID: CPT

## 2023-10-05 PROCEDURE — 80048 BASIC METABOLIC PNL TOTAL CA: CPT

## 2023-10-05 PROCEDURE — 78582 LUNG VENTILAT&PERFUS IMAGING: CPT

## 2023-10-05 PROCEDURE — 97161 PT EVAL LOW COMPLEX 20 MIN: CPT

## 2023-10-05 PROCEDURE — 84156 ASSAY OF PROTEIN URINE: CPT

## 2023-10-05 PROCEDURE — 86900 BLOOD TYPING SEROLOGIC ABO: CPT

## 2023-10-05 PROCEDURE — 99285 EMERGENCY DEPT VISIT HI MDM: CPT | Mod: 25

## 2023-10-05 PROCEDURE — 85730 THROMBOPLASTIN TIME PARTIAL: CPT

## 2023-10-05 PROCEDURE — 85025 COMPLETE CBC W/AUTO DIFF WBC: CPT

## 2023-10-05 PROCEDURE — 84300 ASSAY OF URINE SODIUM: CPT

## 2023-10-05 PROCEDURE — 87635 SARS-COV-2 COVID-19 AMP PRB: CPT

## 2023-10-05 PROCEDURE — 83036 HEMOGLOBIN GLYCOSYLATED A1C: CPT

## 2023-10-05 PROCEDURE — A9567: CPT

## 2023-10-05 PROCEDURE — 82570 ASSAY OF URINE CREATININE: CPT

## 2023-10-05 PROCEDURE — 82728 ASSAY OF FERRITIN: CPT

## 2023-10-05 PROCEDURE — 84133 ASSAY OF URINE POTASSIUM: CPT

## 2023-10-05 PROCEDURE — 87641 MR-STAPH DNA AMP PROBE: CPT

## 2023-10-05 PROCEDURE — 85027 COMPLETE CBC AUTOMATED: CPT

## 2023-10-05 PROCEDURE — 82272 OCCULT BLD FECES 1-3 TESTS: CPT

## 2023-10-05 PROCEDURE — 82436 ASSAY OF URINE CHLORIDE: CPT

## 2023-10-05 PROCEDURE — 83615 LACTATE (LD) (LDH) ENZYME: CPT

## 2023-10-05 PROCEDURE — 36415 COLL VENOUS BLD VENIPUNCTURE: CPT

## 2023-10-05 PROCEDURE — 97110 THERAPEUTIC EXERCISES: CPT

## 2023-10-05 PROCEDURE — 81003 URINALYSIS AUTO W/O SCOPE: CPT

## 2023-10-05 PROCEDURE — 96374 THER/PROPH/DIAG INJ IV PUSH: CPT

## 2023-10-05 PROCEDURE — 84484 ASSAY OF TROPONIN QUANT: CPT

## 2023-10-05 PROCEDURE — 87640 STAPH A DNA AMP PROBE: CPT

## 2023-10-05 PROCEDURE — 93005 ELECTROCARDIOGRAM TRACING: CPT

## 2023-10-05 PROCEDURE — 36600 WITHDRAWAL OF ARTERIAL BLOOD: CPT

## 2023-10-05 PROCEDURE — A9540: CPT

## 2023-10-05 PROCEDURE — 97530 THERAPEUTIC ACTIVITIES: CPT

## 2023-10-05 PROCEDURE — C8929: CPT

## 2023-10-05 PROCEDURE — 0225U NFCT DS DNA&RNA 21 SARSCOV2: CPT

## 2023-10-05 PROCEDURE — 83880 ASSAY OF NATRIURETIC PEPTIDE: CPT

## 2023-10-05 PROCEDURE — 84100 ASSAY OF PHOSPHORUS: CPT

## 2023-10-05 PROCEDURE — 86850 RBC ANTIBODY SCREEN: CPT

## 2023-10-05 PROCEDURE — 85379 FIBRIN DEGRADATION QUANT: CPT

## 2023-10-05 RX ADMIN — Medication 3 UNIT(S): at 11:47

## 2023-10-05 RX ADMIN — Medication 650 MILLIGRAM(S): at 11:46

## 2023-10-05 RX ADMIN — Medication 650 MILLIGRAM(S): at 12:20

## 2023-10-05 RX ADMIN — Medication 3 UNIT(S): at 17:22

## 2023-10-05 RX ADMIN — CHLORHEXIDINE GLUCONATE 1 APPLICATION(S): 213 SOLUTION TOPICAL at 07:21

## 2023-10-05 RX ADMIN — Medication 3: at 11:47

## 2023-10-05 RX ADMIN — Medication 1: at 17:22

## 2023-10-05 RX ADMIN — HEPARIN SODIUM 5000 UNIT(S): 5000 INJECTION INTRAVENOUS; SUBCUTANEOUS at 14:08

## 2023-10-05 RX ADMIN — Medication 3 UNIT(S): at 08:01

## 2023-10-05 RX ADMIN — HEPARIN SODIUM 5000 UNIT(S): 5000 INJECTION INTRAVENOUS; SUBCUTANEOUS at 05:49

## 2023-10-05 RX ADMIN — Medication 25 MILLIGRAM(S): at 05:48

## 2023-10-05 RX ADMIN — Medication 100 MILLIGRAM(S): at 05:48

## 2023-10-05 RX ADMIN — Medication 3: at 08:01

## 2023-10-05 NOTE — PROGRESS NOTE ADULT - ASSESSMENT
This is a 67-year-old female with medical hx of HTN, NIDDM-Type 2, and HLD who presented  for weakness, hypotension, CAGE, and orthopnea.   Upon presentation to [facility] on 9/26, patient was hypertensive 156/71 and tachypneic RR 22 with SpO2 97% on room air. Labs showed a mild hyperchloremic non-anion gap metabolic acidosis with normal lactate and Cr 1.72. BNP elevated >8k. Trops and procal wnl. ESR 74. D-dimer and V/Q scan low probability for PE. COVID/RVP negative. CXR normal. UA bland except for significant glucosuria. Renal US normal. Received Lasix 40mg IV on 9/26 and 500cc LR on 5/27. Cr trending up.    Nephrology service consulted for LOPEZ. Patient denies kidney disease, unclear baseline Cr. Takes Losartan, Metop, Metformin, Glipizide, and Pravastatin. Denies being on diuretic or SGLT-2 inhibitor.     Appears to be cardiorenal. Urine lytes suggestive of prerenal etiology. BNP elevated. While CXR clear, patient appears hypervolemic on exam (elevated JVP, LE pitting edema, orthopnea). BPs elevated. Orthostats negative. Renal US normal. Unclear baseline Cr. Patient appears to have uncontrolled DM and states she has vision problems but is unaware if she has diabetic retinopathy. UA bland, but has significant glucosuria, denied SGLT-2i use.    Plan:  -Please bladder scan for PVR and cath if necessary.  -Consider CT chest WITHOUT contrast to get better view of lung parenchyma.  -Would get ABG in the AM  -cardiac w/u including repeat TTE and NM stress test as per cardiology.   -Agree with holding Losartan for now  -check A1c, needs better glycemic control    If you have any questions, please feel free to contact me  Raghav Sen  Nephrology Fellow  [phone]; Prefer Teams.  (After 5pm or on weekends please page the on-call fellow).
67F c hx HTN, DM2, recent hospitalization @ [facility] (9/24-9/25/23?) for sob and weakness, pw sob, lightheadedness, weakness, hypotension of unclear etiol
This is a 67-year-old female with medical hx of HTN, NIDDM-Type 2, and HLD who presented  for weakness, hypotension, CAGE, and orthopnea. Nephrology consulted for elevated creatinine.     
67F c hx HTN, DM2, recent hospitalization @ [facility] (9/24-9/25/23?) for sob and weakness, pw sob, lightheadedness, weakness, hypotension of unclear etiol
67yoF w/ PMHx of hx HTN, DM2, recent hospitalization @ [facility] (9/24-9/25/23?) for sob and weakness, pw sob, lightheadedness, weakness, hypotension of unclear etiology course c/b likely hospital-acquired COVID 19; pending TTE and NST per cardiology. 
67yoF w/ PMHx of hx HTN, DM2, recent hospitalization @ [facility] (9/24-9/25/23?) for sob and weakness, pw sob, lightheadedness, weakness, hypotension of unclear etiology course c/b likely hospital-acquired COVID 19; pending TTE and NST per cardiology. 
67F c hx HTN, DM2, recent hospitalization @ [facility] (9/24-9/25/23?) for sob and weakness, pw sob, lightheadedness, weakness, hypotension of unclear etiol
67yoF w/ PMHx of hx HTN, DM2, recent hospitalization @ [facility] (9/24-9/25/23?) for sob and weakness, pw sob, lightheadedness, weakness, hypotension of unclear etiology course c/b likely hospital-acquired COVID 19; TTE w/ significant SRIKANTH and elevated outflow gradient, c/w HOCM which may explain sx of exertional fatigue/dyspnea.
67yoF w/ PMHx of hx HTN, DM2, recent hospitalization @ [facility] (9/24-9/25/23?) for sob and weakness, pw sob, lightheadedness, weakness, hypotension of unclear etiology course c/b likely hospital-acquired COVID 19; TTE w/ significant SRIKANTH and elevated outflow gradient, c/w HOCM which may explain sx of exertional fatigue/dyspnea.
67F c hx HTN, DM2, recent hospitalization @ [facility] (9/24-9/25/23?) for sob and weakness, pw sob, lightheadedness, weakness, hypotension of unclear etiol

## 2023-10-05 NOTE — PROGRESS NOTE ADULT - PROBLEM SELECTOR PROBLEM 2
HOCM (hypertrophic obstructive cardiomyopathy)
SOB (shortness of breath)
SOB (shortness of breath)
HOCM (hypertrophic obstructive cardiomyopathy)
SOB (shortness of breath)
Hypotension
HOCM (hypertrophic obstructive cardiomyopathy)
SOB (shortness of breath)

## 2023-10-05 NOTE — PROGRESS NOTE ADULT - PROBLEM SELECTOR PLAN 2
- s/p lasix in the ED  - cxr grossly clear  VQ negative for PE  BNP trending down with diuresis, increase in Cr unsure if cardiorenal but pt is no orthopneic, lungs clear, trace LLE edema  dc NST as unable to tolerate, cards said can be done as outpt  - PT eval
- resolved  - orthostatics negative last checked on 10/1    - TTE result from 9/25/23 on pt's phone grossly normal with mild vavular dysfxn, pending formal TTE here, performed on 10/2, awaiting results  - NST treadmill as pt not a candidate for pharm per cards  - cont to hold antihypertensives for now
- s/p lasix in the ED  - cxr grossly clear  VQ negative for PE  BNP trending down with diuresis, increase in Cr unsure if cardiorenal but pt is no orthopneic, lungs clear, trace LLE edema  Obtain NST  - PT eval
HOCM w/ SRIKANTH - noted on TTE from 10/2  Mobitz type 1 AV block - TSH wnl, lyme neg. Likely not driving sx. Found to have SRIKANTH on TTE.  - hypotension has resolved, orthostatics negative last checked on 10/1    - TTE result from 9/25/23 at OSH on pt's phone grossly normal with mild vavular dysfxn  - TTE performed here on 10/3/23: Left ventricular systolic function is normal with an ejection fraction of 73 % by Alonso's method of disks. Hypertrophic obstructive cardiomyopathy (HOCM). Severe discrete basal septal hypertrophy. The left ventricular outflow tract resting gradient is 108 mmHg and 160 mmHg using the Valsalva maneuver.  - initiated Disopyrimide 100mg Q8H significant SRIKANTH per cardiology's recommendations w/ hold parameters for bradycardia; but on 10/4: per cardiology will - stop disopyramide - start metop 25 succinate - and will have pt f/up w/ Dr. Tyler at the end of next week, after completing COVID-19 isolation  - hold all diuretics, ensure pt is well hydrated, avoid anti-HTN meds that can lead to worsening obstruction  - NST treadmill as pt not a candidate for pharm per cards; patient not able to tolerate treadmill at this time -- can defer to outpatient per cards
HOCM w/ SRIKANTH - noted on TTE from 10/2  Mobitz type 1 AV block - TSH wnl, lyme neg. Likely not driving sx. Found to have SRIKANTH on TTE.  - hypotension has resolved, orthostatics negative last checked on 10/1    - TTE result from 9/25/23 at OSH on pt's phone grossly normal with mild vavular dysfxn  - TTE performed here on 10/3/23: Left ventricular systolic function is normal with an ejection fraction of 73 % by Alonso's method of disks. Hypertrophic obstructive cardiomyopathy (HOCM). Severe discrete basal septal hypertrophy. The left ventricular outflow tract resting gradient is 108 mmHg and 160 mmHg using the Valsalva maneuver.  - initiated Disopyrimide 100mg Q8H significant SRIKANTH per cardiology's recommendations w/ hold parameters for bradycardia; monitor QTc, plan for repeat TTE as outpatient per cardiology, w/ Dr. Tyler in clinic next week  - hold all diuretics, ensure pt is well hydrated, avoid anti-HTN meds that can lead to worsening obstruction  - NST treadmill as pt not a candidate for pharm per cards; patient not able to tolerate treadmill at this time -- can defer to outpatient per cards
- s/p lasix in the ED  - cxr grossly clear  - VQ negative for PE  - BNP trending down with diuresis, increase in Cr unsure if cardiorenal but pt is no orthopneic, lungs clear, trace LLE edema; creatinine currently stable   - patient willing to try to do NST and would like to try inpatient; order in place; if unable to tolerate, then can likely do as outpatient per cardio recs   - PT eval: COLLEEN
- s/p lasix in the ED  - cxr grossly clear  - VQ negative for PE  - BNP trending down with diuresis, increase in Cr unsure if cardiorenal but pt is no orthopneic, lungs clear, trace LLE edema; creatinine currently stable   - patient willing to try to do NST and would like to try inpatient; order in place; if unable to tolerate, then can likely do as outpatient per cardio recs   - PT eval: COLLEEN
HOCM w/ SRIKANTH - noted on TTE from 10/2  Mobitz type 1 AV block - TSH wnl, lyme neg. Likely not driving sx. Found to have SRIKANTH on TTE.  - hypotension has resolved, orthostatics negative last checked on 10/1    - TTE result from 9/25/23 at OSH on pt's phone grossly normal with mild vavular dysfxn  - TTE performed here on 10/3/23: Left ventricular systolic function is normal with an ejection fraction of 73 % by Alonso's method of disks. Hypertrophic obstructive cardiomyopathy (HOCM). Severe discrete basal septal hypertrophy. The left ventricular outflow tract resting gradient is 108 mmHg and 160 mmHg using the Valsalva maneuver.  - initiated Disopyrimide 100mg Q8H significant SRIKANTH per cardiology's recommendations w/ hold parameters for bradycardia but on 10/4, per cardiology will stop disopyramide and start metop 25 succinate and will have pt f/up w/ Dr. Tyler at the end of next week, after completing COVID-19 isolation  - hold all diuretics, ensure pt is well hydrated, avoid anti-HTN meds that can lead to worsening obstruction  - NST treadmill as pt not a candidate for pharm per cards; patient not able to tolerate treadmill at this time -- can defer to outpatient per cards.     FOLLOW UP WITH CARDIOLOGY on 10/5 RE: CONTINUATION OF BETA-BLOCKER AT THIS DOSE GIVEN BRADYCARDIA ON TELEMETRY PRIOR TO DISCHARGE PLANNING.

## 2023-10-05 NOTE — PROGRESS NOTE ADULT - SUBJECTIVE AND OBJECTIVE BOX
Charlie Torres M.D.  Division of Hospital Medicine  Available on [employer] TEAMS    SUBJECTIVE / ACUTE INTERVAL EVENTS: Patient seen and examined. Overnight, patient's telemetry w/ 2:1 AVB and HR in 40-70 range, with intermittent drops to 30s, and this AM HR has been in 30-40s; she denies associated symptoms but is expressing great anxiety related to medication changes. Discussed with cardiology who will review telemetry strips. Discussed with patient's son the patient's clinical course and plan for close outpatient f/u -- will clarify all medications to be continued on discharge along with follow-ups to be scheduled per PAS w/ PCP and cardiology, in the discharge summary which will be given to the patient for her to take to her outpatient providers. Reviewed signs/sx to self monitor for that would necessitate RTC. Patient continues to refuse COLLEEN as recommended by PT. LOPEZ improved s/p IVF yesterday; patient encouraged to continue oral hydration.     OBJECTIVE:   Vital Signs Last 24 Hrs  T(F): 97.5 (05 Oct 2023 11:58), Max: 97.6 (04 Oct 2023 20:19)  HR: 51 (05 Oct 2023 11:58) (51 - 64)  BP: 155/84 (05 Oct 2023 11:58) (141/79 - 155/84)  RR: 18 (05 Oct 2023 11:58) (18 - 18)  SpO2: 99% (05 Oct 2023 11:58) (98% - 100%)    Parameters below as of 05 Oct 2023 11:58  Patient On (Oxygen Delivery Method): room air    I&O's Summary  04 Oct 2023 07:01  -  05 Oct 2023 07:00  --------------------------------------------------------  IN: 480 mL / OUT: 1050 mL / NET: -570 mL    05 Oct 2023 07:01  -  05 Oct 2023 13:07  --------------------------------------------------------  IN: 520 mL / OUT: 0 mL / NET: 520 mL    Physical Examination:  GEN: middle aged woman, laying in bed in NAD  PSYCH: A&Ox3, mood and affect appear anxious  NEURO: no focal neurologic deficits appreciated  RESPI: no accessory muscle use, B/L air entry, CTAB   CARDIO: regular rate/rhythm, no LE edema B/L  ABD: soft, NT, ND  EXT: patient able to move all extremities spontaneously  VASC: peripheral pulses palpated    Labs:  CAPILLARY BLOOD GLUCOSE  POCT Blood Glucose.: 261 mg/dL (05 Oct 2023 11:28)  POCT Blood Glucose.: 265 mg/dL (05 Oct 2023 07:52)  POCT Blood Glucose.: 280 mg/dL (05 Oct 2023 05:33)  POCT Blood Glucose.: 274 mg/dL (04 Oct 2023 21:38)  POCT Blood Glucose.: 206 mg/dL (04 Oct 2023 17:23)                        10.1   4.73  )-----------( 182      ( 04 Oct 2023 07:05 )             32.3     10-04  137  |  108  |  38<H>  ----------------------------<  215<H>  4.8   |  18<L>  |  1.92<H>    Ca    9.6      04 Oct 2023 18:30  Phos  3.2     10-04  Mg     1.8     10-04    Urinalysis Basic - ( 04 Oct 2023 18:30 )  Color: x / Appearance: x / SG: x / pH: x  Gluc: 215 mg/dL / Ketone: x  / Bili: x / Urobili: x   Blood: x / Protein: x / Nitrite: x   Leuk Esterase: x / RBC: x / WBC x   Sq Epi: x / Non Sq Epi: x / Bacteria: x    Consultant(s) Notes Reviewed: Nephrology  Care Discussed with Consultants/Other Providers: Cardiology, ACP Lexi Mccormick    MEDICATIONS  (STANDING):  chlorhexidine 2% Cloths 1 Application(s) Topical <User Schedule>  dextrose 5%. 1000 milliLiter(s) (100 mL/Hr) IV Continuous <Continuous>  dextrose 5%. 1000 milliLiter(s) (50 mL/Hr) IV Continuous <Continuous>  dextrose 50% Injectable 12.5 Gram(s) IV Push once  dextrose 50% Injectable 25 Gram(s) IV Push once  dextrose 50% Injectable 25 Gram(s) IV Push once  glucagon  Injectable 1 milliGRAM(s) IntraMuscular once  heparin   Injectable 5000 Unit(s) SubCutaneous three times a day  influenza  Vaccine (HIGH DOSE) 0.7 milliLiter(s) IntraMuscular once  insulin glargine Injectable (LANTUS) 9 Unit(s) SubCutaneous at bedtime  insulin lispro (ADMELOG) corrective regimen sliding scale   SubCutaneous three times a day before meals  insulin lispro (ADMELOG) corrective regimen sliding scale   SubCutaneous at bedtime  insulin lispro Injectable (ADMELOG) 3 Unit(s) SubCutaneous three times a day before meals  metoprolol succinate ER 25 milliGRAM(s) Oral daily    MEDICATIONS  (PRN):  acetaminophen     Tablet .. 650 milliGRAM(s) Oral every 6 hours PRN Mild Pain (1 - 3)  benzocaine/menthol Lozenge 1 Lozenge Oral every 4 hours PRN Sore Throat  dextrose Oral Gel 15 Gram(s) Oral once PRN Blood Glucose LESS THAN 70 milliGRAM(s)/deciliter  guaiFENesin Oral Liquid (Sugar-Free) 100 milliGRAM(s) Oral every 6 hours PRN Cough

## 2023-10-05 NOTE — CHART NOTE - NSCHARTNOTESELECT_GEN_ALL_CORE
Event Note
Event Note
d/c appt/Event Note
Event Note
Event Note
Rolling Walker/Event Note
hospitalist/Event Note

## 2023-10-05 NOTE — PROGRESS NOTE ADULT - NSPROGADDITIONALINFOA_GEN_ALL_CORE
Discussed with Antonette BRADEN
Total discharge planning time spent thus far = 45minutes.
Discussed with ACP, Gricelda Whitaker

## 2023-10-05 NOTE — PROGRESS NOTE ADULT - PROBLEM SELECTOR PLAN 1
Pt with elevated creatinine on admission thought to be related to medication (ARB, metformin) vs cardiorenal picture. However, notes a long standing DM history and unsure if she has diabetic kidney though complains of vision changes. No previous Scr noted on [facility]. On admission, Scr of 1.72. Renal US with normal sized kidneys and no hydro. Ulytes suggestive of prerenal etiology on admission with elevated BNP 8.5k. UA bland. Patient was diuresed with lasix, but stopped on 9/28 for concern of systolic anterior motion of mitral valve.     Scr remains relatively stable today at 2.06. Please repeat UA, Upr/cr. Patient not volume overloaded, no need for diuretics. No hypotension noted. No nephrotoxic agents noted. Monitor BMP and i/O    If you have any questions, please feel free to contact me  Alex Davila  Nephrology Fellow  [phone]; Prefer [employer] TEAMS  (After 5pm or on weekends please page the on-call fellow).
- monitor respi status (currently pt is not hypoxic; monitoring off Dexamethasone at this time) and hemodynamics closely  - monitor inflammatory markers  - CXR clear, monitoring off Remdesivir at this time  - incentive spirometry  - VTE PPx w/ Hep SC given LOPEZ as below
- orthostatics currently negative last checked on 10/1    - TTE result from 9/25/23 on pt's phone grossly normal with mild vavular dysfxn, pending formal TTE here  - cont to hold antihypertensives for now   - cardio following
- orthostatics negative last 9/30 around midnight   - TTE result from 9/25/23 on pt's phone grossly normal with mild vavular dysfxn, Obtain formal TTE here, still pending  - cont to hold antihypertensives  - cardio following
Recheck Orthostatic vital signs, concern for POTs  - TTE result from 9/25/23 on pt's phone grossly normal with mild vavular dysfxn, Obtain formal TTE here, still pending  - cont to hold antihypertensives
Recheck Orthostatic vital signs, concern for POTs  - TTE result from 9/25/23 on pt's phone grossly normal with mild vavular dysfxn, Obtain formal TTE here  - cont to hold antihypertensives
- monitor respi status (currently pt is not hypoxic; monitoring off Dexamethasone at this time) and hemodynamics closely  - monitor inflammatory markers  - CXR clear, monitoring off Remdesivir at this time  - incentive spirometry  - VTE PPx w/ Hep SC given LOPEZ as below
- monitor respi status (currently pt is not hypoxic) and hemodynamics closely  - f/u repeat CXR and baseline inflammatory markers - will monitor  - incentive spirometry  - VTE PPx w/ Hep SC given LOPEZ as below
- monitor respi status (currently pt is not hypoxic; monitoring off Dexamethasone at this time) and hemodynamics closely  - monitor inflammatory markers  - CXR clear, monitoring off Remdesivir at this time  - incentive spirometry  - VTE PPx w/ Hep SC given LOPEZ as below

## 2023-10-05 NOTE — PROVIDER CONTACT NOTE (OTHER) - NAME OF MD/NP/PA/DO NOTIFIED:
ASIYA Rodarte
KATE Hand.
[Follow-Up - Clinic] : a clinic follow-up of
ASIYA Sy
Serenity Carbajal NP.

## 2023-10-05 NOTE — PROGRESS NOTE ADULT - PROBLEM SELECTOR PLAN 5
- ISS -- added basal/bolus insulin w/ close FS monitoring and titration PRN 9 (hyperglycemia likely iso of infection as above)  - A1C: 8.6  - PT recs COLLEEN
- ISS -- added basal/bolus insulin w/ close FS monitoring and titration PRN  - a1c: 8.6    PT recs COLLEEN
- ISS -- increased basal/bolus insulin w/ close FS monitoring on 10/4 (hyperglycemia likely iso of infection as above)  - A1C: 8.6  - PT recs COLLEEN but pt declining in favor of dispo home
- ISS -- increased basal/bolus insulin w/ close FS monitoring on 10/4 (hyperglycemia likely iso of infection as above)  - A1C: 8.6  - close outpatient f/u w/ PCP for DM management and ?endocrinology referral as O/P  - PT recs COLLEEN but pt declining in favor of dispo home

## 2023-10-05 NOTE — CHART NOTE - NSCHARTNOTEFT_GEN_A_CORE
Asked to see patient for coughing  Chart reviewed, pt seen., pt's son at bedside. Pt acknowledged she has a cough with onset "yesterday". She reports that when she coughs she has abdominal pain and her throat is "uncomfortable". No shortness of breath/headache/dizziness/nausea/vomiting/fever/chills.     On Exam:  General: sitting at edge of bed, NAD  Mouth: membranes moist, pink, no erythema, no lesions to oropharyngeal  Resp: lungs are clear anteriorly and posteriorly to auscultation; clear sputum in cup at bedside, no coughing noted during visit/exam  Abd: obese, soft NTND    A/P  throat discomfort  will give chlorseptic lozenges  incentive spirometer  monitor    Vital Signs Last 24 Hrs  T(C): 37.2 (02 Oct 2023 00:00), Max: 37.2 (02 Oct 2023 00:00)  T(F): 99 (02 Oct 2023 00:00), Max: 99 (02 Oct 2023 00:00)  HR: 65 (02 Oct 2023 00:00) (56 - 69)  BP: 139/81 (02 Oct 2023 00:00) (109/78 - 176/71)  RR: 18 (02 Oct 2023 00:00) (18 - 18)  SpO2: 96% (02 Oct 2023 00:00) (94% - 96%)  Parameters below as of 02 Oct 2023 00:00  Patient On (Oxygen Delivery Method): room air      LABS                      9.8    5.35  )-----------( 165      ( 01 Oct 2023 06:04 )             30.8     10-01    135  |  106  |  43<H>  ----------------------------<  241<H>  4.5   |  18<L>  |  1.94<H>    Ca    9.3      01 Oct 2023 06:04  Phos  3.8     10-01  Mg     1.8     10-01  Urinalysis Basic - ( 01 Oct 2023 06:04 )  Color: x / Appearance: x / SG: x / pH: x  Gluc: 241 mg/dL / Ketone: x  / Bili: x / Urobili: x   Blood: x / Protein: x / Nitrite: x   Leuk Esterase: x / RBC: x / WBC x   Sq Epi: x / Non Sq Epi: x / Bacteria: x
Medicine PA Note    Asked by hospitalist to f/u with cardiology today re continuation of Metoprolol succinate 25 mg daily at this dose given brief episode of bradycardia on tele at 04:13 this AM.    Spoke with Dr. Cavazos cardiology who recommended to c/w 25 mg daily of Metoprolol succinate and ensure pt follows up with Dr. Tyler within 1 week. Called Dr. Tyler's office and set pt up for first available appointment on 10/13 at 3pm. Pt aware.    Medication reconciliation reviewed with pt by ACP, attending, and clinical pharmacist. All questions answered. Metoprolol succinate delivered to the bedside.    Pt medically cleared for d/c home with home care by medicine attending and cardiology.    OLIVA GloverC  [id number]
Patient requires a rolling walker for d/c to home due to diagnosis of Orthostatic Hypotension.
dyspnea and generalized fatigue likely due to orthostatic hypotension  - orthostatics positive-- will hydrate  cc/ hr x 5 hours and repeat orthostatics  - hold off on further diuresis as patient does not appear overloaded despite elevated pro bnp (pro bnp ordered with repeat labs today); of note: outpatient TTE with no valvular disease, preserved EF and no diastolic dysfunction. Will not repeat TTE here.   - repeat bmp and lactate ordered for the afternoon  - VQ scan pending (however low concern for acute PE given normal d dimer)  - COLLEEN when ready    Radha David D.O.  Division of Hospital Medicine  Available on [employer] Teams
66 yo F with PMHx HTN, T2DM admitted for episodes of dyspnea and weakness on exertion. Cardiology consulted for 2:1 and 3:1 AV block, in Mobitz 1 as seen by occasional 3:2 groups seen on telemetry. EKGs at her last hospitalization also noted Mobitz 1 heart block. The etiology of her weakness episodes is unclear, and unlikely to be secondary to the 2:1 AV block as her HRs have remained in 50s and 60s and her symptoms are profound with even minimal activity. TTE w/ significant SRIKANTH and elevated outflow gradient, c/w HOCM which may explain sx of exertional fatigue/dyspnea.    Pt had been started on disopyramide 100mg TID iso HOCM however QTc appears slightly prolonged, ~half the R-R interval on EKG, however difficult to interpret iso 1:2 AV block. QTc measuring by manual correction ~480ms. Spoke w/ Dr. Tyler who felt we should stop disopyramide at this time and start low dose metop, and plan for f/up in the outpt.      Recommendations:  - Stop disopyramide  - start metop 25 succinate, and continue  - please have pt f/up w/ Dr. Tyler at the end of next week, after completing COVID isolation    Pancho Cavazos MD  Cardiology Fellow
An attempt was made to reach patient, which has been unsuccessful. Unable to leave voicemail on 10/5.
Notified by day provider to follow up for tele event noted at 17:00 with Mobitz 1 w/ HR 45 briefly, VSS, pt was asymptomatic at the time  Noted on tele pt with NSR 70's HR with recurrent brief episodes of HR in high 50s with underlying Mobitz 1   Pt seen at bedside, informs she feels better than when she first did at OSH w/ symptoms of weakness. Does confirm a palpiations she described as "nibble" in her chest, no CP or SOB associated with the symptoms.   Pt presently not on any AV karen blocking agents.  Notified [facility] Dr. Jose Guadalupe Miller of tele events, agrees with [facility] consult  Spoke with [facility] Cardiologist Dr. Nasim Rosales, who requested 12 lead EKG   Pt seen by Cardiology consulted tonight for Mobitz 1 recommended Echo for further evaluation of possible murmur.   TSH and Lyme titers  Avoid AV karen agents   Continue with tele monitoring  Cardiology team to further follow up in AM with attending.     Smitha Payne, LIZA   [id number]

## 2023-10-05 NOTE — PROVIDER CONTACT NOTE (OTHER) - BACKGROUND
hx HTN, DM2, recent hospitalization @ [facility] (9/24-9/25/23?) for sob and weakness, pw sob, lightheadedness, weakness, hypotension of unclear etiol
66 y/o female admitted for Weakness with a pmhx of DM and HTN
Patient admitted for Dyspnea, Chest x-ray completed yesterday clear.
66 y/o female with a pmhx of HTN, DM2, dyspnea and SVT admitted for dyspnea

## 2023-10-05 NOTE — PROVIDER CONTACT NOTE (OTHER) - ACTION/TREATMENT ORDERED:
ACP aware.
No new orders at this time, plan of care ongoing.
Provider Aware provider at bedside to assess. Order throat lozenge, Incentive spirometer.
No new orders at this time plan of care ongoing.

## 2023-10-05 NOTE — PROGRESS NOTE ADULT - PROBLEM SELECTOR PROBLEM 3
LOPEZ (acute kidney injury)
SOB (shortness of breath)
LOPEZ (acute kidney injury)
SOB (shortness of breath)

## 2023-10-05 NOTE — DISCHARGE NOTE NURSING/CASE MANAGEMENT/SOCIAL WORK - PATIENT PORTAL LINK FT
You can access the FollowMyHealth Patient Portal offered by [facility] by registering at the following website: [email or web address]. By joining [facility]’s FollowMyHealth portal, you will also be able to view your health information using other applications (apps) compatible with our system.

## 2023-10-05 NOTE — PROGRESS NOTE ADULT - PROBLEM SELECTOR PLAN 4
- ISS  - a1c: 8.6    PT recs COLLEEN
- uptrending on 10/4 -- discussed w/ cardiology, will give IVF and monitor Cr thereafter -- improved later in day  - encouraged oral hydration as above  - holding losartan and diuretics as above  - trend cr, Renal US wnl  - UA, bladder scan, Pr/Cr, Urine electrolytes sent  - avoid nephrotoxins and IV contrast
- ISS  - a1c
- holding losartan and diuretics as above  - trend cr, Renal US wnl  - UA, bladder scan, Pr/Cr, Urine electrolytes sent  - avoid nephrotoxins and IV contrast
- ISS  - a1c
- ISS  - a1c: 8.6
- uptrending on 10/4 -- discussed w/ cardiology, will give IVF and monitor Cr thereafter  - encourage oral hydration as above  - holding losartan and diuretics as above  - trend cr, Renal US wnl  - UA, bladder scan, Pr/Cr, Urine electrolytes sent  - avoid nephrotoxins and IV contrast
- holding losartan and diuretics for now  - trend cr, Renal US wnl  - UA, bladder scan, Pr/Cr, Urine electrolytes sent  - avoid nephrotoxins and IV contrast

## 2023-10-05 NOTE — PROGRESS NOTE ADULT - PROBLEM SELECTOR PLAN 3
- etiology likely related to HOCM/SRIKANTH as above vs. COVID 19 as above  - s/p lasix in the ED - holding further diuretics as above, encouraging hydration  - VQ negative for PE  - BNP trending down with diuresis, increase in Cr unsure if cardiorenal but pt is no orthopneic, lungs clear, trace LLE edema; creatinine currently stable   - NST treadmill as pt not a candidate for pharm per cards; patient wants to try to do NST inpatient; if unable to tolerate, then can likely do as outpatient per cardio recs   - PT eval: COLLEEN
- s/p lasix in the ED  - cxr grossly clear  - VQ negative for PE  - BNP trending down with diuresis, increase in Cr unsure if cardiorenal but pt is no orthopneic, lungs clear, trace LLE edema; creatinine currently stable   - NST treadmill as pt not a candidate for pharm per cards; patient wants to try to do NST inpatient; if unable to tolerate, then can likely do as outpatient per cardio recs   - PT eval: COLLEEN
- etiology likely related to HOCM/SRIKANTH as above +/- COVID 19 as above  - s/p lasix in the ED - holding further diuretics as above, encouraging hydration  - VQ negative for PE  - BNP trending down with diuresis, increase in Cr unsure if cardiorenal but pt is no orthopneic, lungs clear, trace LLE edema; creatinine currently stable   - NST treadmill as pt not a candidate for pharm per cards; patient wants to try to do NST inpatient; if unable to tolerate, then can likely do as outpatient per cardio recs   - PT eval: COLLEEN
- holding losartan and diuretics for now  - trend cr, Renal US wnl  UA, bladder scan, Pr/Cr, Urine electrolytes sent  - avoid nephrotoxins and IV contrast
- holding losartan and diuretics for now  - trend cr, obtain Renal US, UA, bladder scan, Pr/Cr, Urine electrolytes  - avoid nephrotoxins and IV contrast
- holding losartan and diuretics for now  - trend cr, Renal US wnl  UA, bladder scan, Pr/Cr, Urine electrolytes sent  - avoid nephrotoxins and IV contrast
- holding losartan and diuretics for now  - trend cr, Renal US wnl  UA, bladder scan, Pr/Cr, Urine electrolytes sent  - avoid nephrotoxins and IV contrast
- etiology likely related to HOCM/SRIKANTH as above +/- COVID 19 as above  - s/p lasix in the ED - holding further diuretics as above, encouraging hydration  - VQ negative for PE  - NST treadmill as pt not a candidate for pharm per cards; patient wants to try to do NST inpatient; if unable to tolerate, then can likely do as outpatient per cardio recs   - PT eval: COLELEN but patient refusing

## 2023-10-05 NOTE — PROGRESS NOTE ADULT - REASON FOR ADMISSION
sob, lightheadedness, weakness, hypotension

## 2023-10-05 NOTE — DISCHARGE NOTE NURSING/CASE MANAGEMENT/SOCIAL WORK - NSDCPEFALRISK_GEN_ALL_CORE
For information on Fall & Injury Prevention, visit: [email or web address] OR  [email or web address] OR  https://www.cdc.gov/steadi/patient.html

## 2023-10-05 NOTE — PROVIDER CONTACT NOTE (OTHER) - REASON
Patient C/O having productive cough, Sputum at the bedside clear
Patient had an episode 2:1 Heart block down to 45 bpm
Patient states she feel short of breath and weak
2:1 heart block HR down to 35, nonsustaining

## 2023-10-05 NOTE — PROGRESS NOTE ADULT - PROBLEM SELECTOR PROBLEM 1
Hypotension
LOPEZ (acute kidney injury)
2019 novel coronavirus disease (COVID-19)
Hypotension
2019 novel coronavirus disease (COVID-19)
2019 novel coronavirus disease (COVID-19)
Hypotension
Hypotension
2019 novel coronavirus disease (COVID-19)

## 2023-10-05 NOTE — PROGRESS NOTE ADULT - PROVIDER SPECIALTY LIST ADULT
Cardiology
Cardiology
Hospitalist
Cardiology
Hospitalist
Nephrology
Nephrology
Hospitalist

## 2023-10-05 NOTE — PROVIDER CONTACT NOTE (OTHER) - SITUATION
Patient states she feel short of breath and weak
Patient C/O having productive cough, Sputum at the bedside clear
Patient had an episode 2:1 Heart block down to 45 bpm
2:1 heart block HR down to 35, nonsustaining

## 2023-10-05 NOTE — DISCHARGE NOTE NURSING/CASE MANAGEMENT/SOCIAL WORK - NSDCFUADDAPPT_GEN_ALL_CORE_FT
APPTS ARE READY TO BE MADE: [X] YES    Best Family or Patient Contact (if needed):    Additional Information about above appointments (if needed):    1: Cardiology - Dr. Tyler on 10/13/23  2: PCP - Dr. Chase - within 5-7 days of discharge    Other comments or requests:

## 2023-10-09 ENCOUNTER — NON-APPOINTMENT (OUTPATIENT)
Age: 68
End: 2023-10-09

## 2023-10-09 DIAGNOSIS — I44.1 ATRIOVENTRICULAR BLOCK, SECOND DEGREE: ICD-10-CM

## 2023-10-09 DIAGNOSIS — E11.9 TYPE 2 DIABETES MELLITUS W/OUT COMPLICATIONS: ICD-10-CM

## 2023-10-09 DIAGNOSIS — I42.2 OTHER HYPERTROPHIC CARDIOMYOPATHY: ICD-10-CM

## 2023-10-09 DIAGNOSIS — I10 ESSENTIAL (PRIMARY) HYPERTENSION: ICD-10-CM

## 2023-10-09 PROBLEM — Z00.00 ENCOUNTER FOR PREVENTIVE HEALTH EXAMINATION: Status: ACTIVE | Noted: 2023-10-09

## 2023-10-09 RX ORDER — GLIMEPIRIDE 4 MG/1
4 TABLET ORAL DAILY
Refills: 0 | Status: ACTIVE | COMMUNITY

## 2023-10-09 RX ORDER — METFORMIN HYDROCHLORIDE 500 MG/1
500 TABLET, COATED ORAL
Refills: 0 | Status: ACTIVE | COMMUNITY

## 2023-10-09 RX ORDER — METOPROLOL SUCCINATE 25 MG/1
25 TABLET, EXTENDED RELEASE ORAL
Qty: 30 | Refills: 1 | Status: ACTIVE | COMMUNITY

## 2023-10-13 ENCOUNTER — APPOINTMENT (OUTPATIENT)
Dept: CARDIOLOGY | Facility: CLINIC | Age: 68
End: 2023-10-13

## 2024-08-12 NOTE — PHYSICAL THERAPY INITIAL EVALUATION ADULT - PERTINENT HX OF CURRENT PROBLEM, REHAB EVAL
Pt is a 67F admitted to [facility] on 9/27/23 c hx HTN, DM2, recent hospitalization @ [facility] (9/24-9/25/23?) for sob and weakness, pw sob, lightheadedness, weakness, hypotension. Pt states she was in usual state of health until 10 days ago, when her symptoms started. Pt states that on stand and walking, pt gets whole body weakness, lightheaded, palpitations. Pt states she checked her blood pressure and it was SBP ~88. Pt also reports SOB on exertion and on lying flat. Pt presented to OSH where she was found to have Cr of 1.7-1.8, hb of ~10, TTE grossly normal with EF 76%, renal ultrasound without hydronephrosis. All these results from OSH are on pt's phone. On discharge from OSH, pt was told to stop her losartan and metoprolol. Pt states her symptoms did not get any better, which brings her into [facility]. Pt reports flying back from [facility] in Jun '23, and while in [facility] she had leg swelling, but the swelling has since resolved. Pt denies fevers, CP, diarrhea, URI symptoms. Pt also denies focal arm/leg weakness. Pt reports her last stress test was 1 year ago, but it was terminated early as she was not feeling well from having covid at that time. unknown result of stress test. Hospital course: CXR: clear lungs, TTE result from 9/25/23 on pt's phone grossly normal with mild vavular dysfxn, check VQ scan, ddimer; vte - HSQ 96

## 2024-10-22 ENCOUNTER — INPATIENT (INPATIENT)
Facility: HOSPITAL | Age: 69
LOS: 0 days | Discharge: HOME | DRG: 313 | End: 2024-10-23
Attending: HOSPITALIST | Admitting: STUDENT IN AN ORGANIZED HEALTH CARE EDUCATION/TRAINING PROGRAM
Payer: MEDICARE

## 2024-10-22 ENCOUNTER — RESULT REVIEW (OUTPATIENT)
Age: 69
End: 2024-10-22

## 2024-10-22 VITALS
RESPIRATION RATE: 20 BRPM | HEIGHT: 63 IN | HEART RATE: 92 BPM | OXYGEN SATURATION: 96 % | TEMPERATURE: 98 F | DIASTOLIC BLOOD PRESSURE: 77 MMHG | WEIGHT: 179.9 LBS | SYSTOLIC BLOOD PRESSURE: 136 MMHG

## 2024-10-22 DIAGNOSIS — R06.00 DYSPNEA, UNSPECIFIED: ICD-10-CM

## 2024-10-22 DIAGNOSIS — E11.9 TYPE 2 DIABETES MELLITUS WITHOUT COMPLICATIONS: ICD-10-CM

## 2024-10-22 DIAGNOSIS — I10 ESSENTIAL (PRIMARY) HYPERTENSION: ICD-10-CM

## 2024-10-22 DIAGNOSIS — E78.5 HYPERLIPIDEMIA, UNSPECIFIED: ICD-10-CM

## 2024-10-22 DIAGNOSIS — R07.89 OTHER CHEST PAIN: ICD-10-CM

## 2024-10-22 DIAGNOSIS — Z90.49 ACQUIRED ABSENCE OF OTHER SPECIFIED PARTS OF DIGESTIVE TRACT: Chronic | ICD-10-CM

## 2024-10-22 DIAGNOSIS — Z29.9 ENCOUNTER FOR PROPHYLACTIC MEASURES, UNSPECIFIED: ICD-10-CM

## 2024-10-22 DIAGNOSIS — I42.1 OBSTRUCTIVE HYPERTROPHIC CARDIOMYOPATHY: ICD-10-CM

## 2024-10-22 DIAGNOSIS — R07.9 CHEST PAIN, UNSPECIFIED: ICD-10-CM

## 2024-10-22 DIAGNOSIS — I50.32 CHRONIC DIASTOLIC (CONGESTIVE) HEART FAILURE: ICD-10-CM

## 2024-10-22 LAB
ADD ON TEST-SPECIMEN IN LAB: SIGNIFICANT CHANGE UP
ALBUMIN SERPL ELPH-MCNC: 3.9 G/DL — SIGNIFICANT CHANGE UP (ref 3.3–5)
ALP SERPL-CCNC: 80 U/L — SIGNIFICANT CHANGE UP (ref 40–120)
ALT FLD-CCNC: 15 U/L — SIGNIFICANT CHANGE UP (ref 10–45)
ANION GAP SERPL CALC-SCNC: 13 MMOL/L — SIGNIFICANT CHANGE UP (ref 5–17)
AST SERPL-CCNC: 13 U/L — SIGNIFICANT CHANGE UP (ref 10–40)
BASOPHILS # BLD AUTO: 0.03 K/UL — SIGNIFICANT CHANGE UP (ref 0–0.2)
BASOPHILS NFR BLD AUTO: 0.4 % — SIGNIFICANT CHANGE UP (ref 0–2)
BILIRUB SERPL-MCNC: 0.2 MG/DL — SIGNIFICANT CHANGE UP (ref 0.2–1.2)
BUN SERPL-MCNC: 34 MG/DL — HIGH (ref 7–23)
CALCIUM SERPL-MCNC: 9.7 MG/DL — SIGNIFICANT CHANGE UP (ref 8.4–10.5)
CHLORIDE SERPL-SCNC: 105 MMOL/L — SIGNIFICANT CHANGE UP (ref 96–108)
CO2 SERPL-SCNC: 19 MMOL/L — LOW (ref 22–31)
CREAT SERPL-MCNC: 1.7 MG/DL — HIGH (ref 0.5–1.3)
CRP SERPL-MCNC: <3 MG/L — SIGNIFICANT CHANGE UP (ref 0–4)
D DIMER BLD IA.RAPID-MCNC: 194 NG/ML DDU — SIGNIFICANT CHANGE UP
EGFR: 32 ML/MIN/1.73M2 — LOW
EOSINOPHIL # BLD AUTO: 0.21 K/UL — SIGNIFICANT CHANGE UP (ref 0–0.5)
EOSINOPHIL NFR BLD AUTO: 3 % — SIGNIFICANT CHANGE UP (ref 0–6)
ERYTHROCYTE [SEDIMENTATION RATE] IN BLOOD: 58 MM/HR — HIGH (ref 0–20)
GAS PNL BLDV: SIGNIFICANT CHANGE UP
GLUCOSE BLDC GLUCOMTR-MCNC: 121 MG/DL — HIGH (ref 70–99)
GLUCOSE BLDC GLUCOMTR-MCNC: 144 MG/DL — HIGH (ref 70–99)
GLUCOSE BLDC GLUCOMTR-MCNC: 324 MG/DL — HIGH (ref 70–99)
GLUCOSE SERPL-MCNC: 232 MG/DL — HIGH (ref 70–99)
HCT VFR BLD CALC: 37.4 % — SIGNIFICANT CHANGE UP (ref 34.5–45)
HGB BLD-MCNC: 11.7 G/DL — SIGNIFICANT CHANGE UP (ref 11.5–15.5)
IMM GRANULOCYTES NFR BLD AUTO: 0.3 % — SIGNIFICANT CHANGE UP (ref 0–0.9)
LYMPHOCYTES # BLD AUTO: 1.67 K/UL — SIGNIFICANT CHANGE UP (ref 1–3.3)
LYMPHOCYTES # BLD AUTO: 24 % — SIGNIFICANT CHANGE UP (ref 13–44)
MCHC RBC-ENTMCNC: 26.4 PG — LOW (ref 27–34)
MCHC RBC-ENTMCNC: 31.3 GM/DL — LOW (ref 32–36)
MCV RBC AUTO: 84.4 FL — SIGNIFICANT CHANGE UP (ref 80–100)
MONOCYTES # BLD AUTO: 0.42 K/UL — SIGNIFICANT CHANGE UP (ref 0–0.9)
MONOCYTES NFR BLD AUTO: 6 % — SIGNIFICANT CHANGE UP (ref 2–14)
NEUTROPHILS # BLD AUTO: 4.61 K/UL — SIGNIFICANT CHANGE UP (ref 1.8–7.4)
NEUTROPHILS NFR BLD AUTO: 66.3 % — SIGNIFICANT CHANGE UP (ref 43–77)
NRBC # BLD: 0 /100 WBCS — SIGNIFICANT CHANGE UP (ref 0–0)
NT-PROBNP SERPL-SCNC: 1004 PG/ML — HIGH (ref 0–300)
PLATELET # BLD AUTO: 219 K/UL — SIGNIFICANT CHANGE UP (ref 150–400)
POTASSIUM SERPL-MCNC: 4.4 MMOL/L — SIGNIFICANT CHANGE UP (ref 3.5–5.3)
POTASSIUM SERPL-SCNC: 4.4 MMOL/L — SIGNIFICANT CHANGE UP (ref 3.5–5.3)
PROT SERPL-MCNC: 7.2 G/DL — SIGNIFICANT CHANGE UP (ref 6–8.3)
RBC # BLD: 4.43 M/UL — SIGNIFICANT CHANGE UP (ref 3.8–5.2)
RBC # FLD: 13.2 % — SIGNIFICANT CHANGE UP (ref 10.3–14.5)
SODIUM SERPL-SCNC: 137 MMOL/L — SIGNIFICANT CHANGE UP (ref 135–145)
TROPONIN T, HIGH SENSITIVITY RESULT: 16 NG/L — SIGNIFICANT CHANGE UP (ref 0–51)
TROPONIN T, HIGH SENSITIVITY RESULT: 17 NG/L — SIGNIFICANT CHANGE UP (ref 0–51)
TROPONIN T, HIGH SENSITIVITY RESULT: 18 NG/L — SIGNIFICANT CHANGE UP (ref 0–51)
TSH SERPL-MCNC: 2.27 UIU/ML — SIGNIFICANT CHANGE UP (ref 0.27–4.2)
WBC # BLD: 6.96 K/UL — SIGNIFICANT CHANGE UP (ref 3.8–10.5)
WBC # FLD AUTO: 6.96 K/UL — SIGNIFICANT CHANGE UP (ref 3.8–10.5)

## 2024-10-22 PROCEDURE — 99221 1ST HOSP IP/OBS SF/LOW 40: CPT

## 2024-10-22 PROCEDURE — 71045 X-RAY EXAM CHEST 1 VIEW: CPT | Mod: 26

## 2024-10-22 PROCEDURE — 93306 TTE W/DOPPLER COMPLETE: CPT | Mod: 26

## 2024-10-22 PROCEDURE — 99285 EMERGENCY DEPT VISIT HI MDM: CPT

## 2024-10-22 PROCEDURE — 99223 1ST HOSP IP/OBS HIGH 75: CPT

## 2024-10-22 RX ORDER — INSULIN LISPRO 100/ML
VIAL (ML) SUBCUTANEOUS AT BEDTIME
Refills: 0 | Status: DISCONTINUED | OUTPATIENT
Start: 2024-10-22 | End: 2024-10-23

## 2024-10-22 RX ORDER — ASPIRIN/MAG CARB/ALUMINUM AMIN 325 MG
81 TABLET ORAL DAILY
Refills: 0 | Status: DISCONTINUED | OUTPATIENT
Start: 2024-10-23 | End: 2024-10-23

## 2024-10-22 RX ORDER — INSULIN GLARGINE,HUM.REC.ANLOG 100/ML
6 VIAL (ML) SUBCUTANEOUS AT BEDTIME
Refills: 0 | Status: DISCONTINUED | OUTPATIENT
Start: 2024-10-22 | End: 2024-10-22

## 2024-10-22 RX ORDER — ASPIRIN/MAG CARB/ALUMINUM AMIN 325 MG
324 TABLET ORAL ONCE
Refills: 0 | Status: COMPLETED | OUTPATIENT
Start: 2024-10-22 | End: 2024-10-22

## 2024-10-22 RX ORDER — EZETIMIBE 10 MG/1
10 TABLET ORAL AT BEDTIME
Refills: 0 | Status: DISCONTINUED | OUTPATIENT
Start: 2024-10-22 | End: 2024-10-23

## 2024-10-22 RX ORDER — LOSARTAN POTASSIUM 25 MG/1
25 TABLET ORAL DAILY
Refills: 0 | Status: DISCONTINUED | OUTPATIENT
Start: 2024-10-22 | End: 2024-10-23

## 2024-10-22 RX ORDER — METOPROLOL TARTRATE 50 MG
25 TABLET ORAL DAILY
Refills: 0 | Status: DISCONTINUED | OUTPATIENT
Start: 2024-10-22 | End: 2024-10-23

## 2024-10-22 RX ORDER — GLUCAGON INJECTION, SOLUTION 1 MG/.2ML
1 INJECTION, SOLUTION SUBCUTANEOUS ONCE
Refills: 0 | Status: DISCONTINUED | OUTPATIENT
Start: 2024-10-22 | End: 2024-10-23

## 2024-10-22 RX ORDER — PANTOPRAZOLE SODIUM 40 MG/1
40 TABLET, DELAYED RELEASE ORAL
Refills: 0 | Status: DISCONTINUED | OUTPATIENT
Start: 2024-10-22 | End: 2024-10-23

## 2024-10-22 RX ORDER — EZETIMIBE 10 MG/1
1 TABLET ORAL
Refills: 0 | DISCHARGE

## 2024-10-22 RX ORDER — INSULIN LISPRO 100/ML
VIAL (ML) SUBCUTANEOUS
Refills: 0 | Status: DISCONTINUED | OUTPATIENT
Start: 2024-10-22 | End: 2024-10-23

## 2024-10-22 RX ORDER — HEPARIN SODIUM 10000 [USP'U]/ML
5000 INJECTION INTRAVENOUS; SUBCUTANEOUS EVERY 8 HOURS
Refills: 0 | Status: DISCONTINUED | OUTPATIENT
Start: 2024-10-22 | End: 2024-10-23

## 2024-10-22 RX ADMIN — Medication 0.6 MILLIGRAM(S): at 12:19

## 2024-10-22 RX ADMIN — Medication 4: at 22:50

## 2024-10-22 RX ADMIN — Medication 0.6 MILLIGRAM(S): at 17:30

## 2024-10-22 RX ADMIN — EZETIMIBE 10 MILLIGRAM(S): 10 TABLET ORAL at 22:50

## 2024-10-22 RX ADMIN — HEPARIN SODIUM 5000 UNIT(S): 10000 INJECTION INTRAVENOUS; SUBCUTANEOUS at 22:50

## 2024-10-22 RX ADMIN — HEPARIN SODIUM 5000 UNIT(S): 10000 INJECTION INTRAVENOUS; SUBCUTANEOUS at 15:08

## 2024-10-22 RX ADMIN — Medication 324 MILLIGRAM(S): at 07:57

## 2024-10-22 NOTE — H&P ADULT - PROBLEM SELECTOR PLAN 2
Diagnosed in 10/23  - Has been following cardiologist outpatient  - c/w Metoprolol, Zetia at home doses, added ASA 81mg  - will f/u Echo result and cardiology plans Diagnosed in 10/23. echo showed- Left ventricular cavity is normally sized. Left ventricular wall thickness is mildly increased. Left ventricular systolic function is normal with an ejection fraction of 73 % by Alonso's method of disks.  Hypertrophic obstructive cardiomyopathy (HOCM). Severe discrete basal septal hypertrophy left ventricular outflow tract resting gradient is 108 mmHg and 160 mmHg using the Valsalva maneuver.  - Has been following cardiologist outpatient  - c/w Metoprolol, Zetia at home doses, added ASA 81mg  - will f/u Echo result and cardiology plans

## 2024-10-22 NOTE — H&P ADULT - PROBLEM SELECTOR PLAN 1
L sided stabbing pain 2 nights in a row,  EKG- Tinv in 1, AVL, MORIS in inf, lateral leads, Trops wnl  - Suspected acute pericarditis vs HOCM, less likely ACS or PE  - appreciated cardiology plans- ordered ASA 81mg/d, colchicine 0.6mg bid, Echo to see WMA and gradients  - c/w Home meds- Metoprolol Er 25mg/d, Zetia  - further plans with Cardiology L sided stabbing pain 2 nights in a row,  EKG- Tinv in 1, AVL, MORIS in inf, lateral leads, Trops wnl  - Suspected acute pericarditis vs HOCM, less likely ACS or PE  - appreciated cardiology plans- ordered ASA 81mg/d, colchicine 0.6mg bid, Echo to see WMA and gradients  - Sed rate, CRP added, vasculitis w/u sent  - c/w Home meds- Metoprolol Er 25mg/d, Zetia  - further plans with Cardiology L sided stabbing pain 2 nights in a row,  EKG- Tinv in 1, AVL, MORIS in inf, lateral leads ( new changes), Trops wnl  - Suspected acute pericarditis vs HOCM, less likely ACS or PE  - appreciated cardiology plans- ordered ASA 81mg/d, colchicine 0.6mg bid, Echo to see WMA and gradients  - Sed rate, CRP added, vasculitis w/u sent  - c/w Home meds- Metoprolol ER 25mg/d, Zetia 10mf qhs  - further plans with Cardiology

## 2024-10-22 NOTE — H&P ADULT - PROBLEM SELECTOR PLAN 4
BP has been stable  - c/w home meds- Metoprolol ER 25mg/d and Losartan-HCTZ 50-12.5mg/d with holding parameters  - Scr 1.7, watch closely

## 2024-10-22 NOTE — CONSULT NOTE ADULT - ASSESSMENT
Assessment and Recommendations:    #Pleuritic Chest Pain i/s/o HCM  Patient coming in with two episodes of sharp, pleuritic midsternal chest pain with radiation to her back, last episode was 8 hours ago before coming to ED. Patient has been asymptomatic since arrival, and troponins x2 are WNL. ECG showing MORIS in inferior leads, minimal MORIS anterior leads, but some ST depressions and TWI in I, aVL. Concerning ECG findings for ischemia but with her story, could be pericarditis. Given her history of HCM, would recommend that she get a stress echo to evaluate for WMA as well as to obtain LVOT gradients, which will be helpful for her overall care.     Recs:  - formal TTE (with valsalva for gradients)  - ASA 81 mg daily, discussed with interventionalist and would not treat as ACS at this moment  - would recommend stress echo to evaluate for gradients (please see attending attestation as we are looking to discuss this with HCM specialist and may opt for CCTA instead)  - c/w zetia  - could trial a statin other than the one that caused her myopathy  - A1C, lipid profile, TSH  - c/w home metoprolol  - reintroduce her home BP regimen as tolerated  - admit to medicine telemetry  - if pericarditis, consider colchicine          These are preliminary recommendations. Please see attending attestation for final recommendations.    Assessment and Recommendations:    #Pleuritic Chest Pain i/s/o HCM  Patient coming in with two episodes of sharp, pleuritic midsternal chest pain with radiation to her back, last episode was 8 hours ago before coming to ED. Patient has been asymptomatic since arrival, and troponins x2 are WNL. ECG showing MORIS in inferior leads, minimal MORIS anterior leads, but some ST depressions and TWI in I, aVL. Concerning ECG findings for ischemia but with her story, could be pericarditis. Given her history of HCM, would recommend that she get a stress echo to evaluate for WMA as well as to obtain LVOT gradients, which will be helpful for her overall care.     Recs:  - formal TTE (with valsalva for gradients)  - ASA 81 mg daily, discussed with interventionalist and would not treat as ACS at this moment  - would recommend stress echo to evaluate for gradients (please see attending attestation as we are looking to discuss this with HCM specialist and may opt for CCTA instead)  - c/w zetia  - could trial a statin other than the one that caused her myopathy  - A1C, lipid profile, TSH  - c/w home metoprolol  - reintroduce her home BP regimen as tolerated  - admit to medicine telemetry  - if pericarditis, consider colchicine    These are preliminary recommendations. Please see attending attestation for final recommendations.     This patient was seen and examined personally by me and the plan was discussed with the fellow and/or resident above. Amendments were made as necessary to the above. Agree with the excellent note and plan above. 68F w HCM, HTN, DM2, HL here w CP, now resolved. ECG w MORIS but trop flat, CP now resolved.   -tele  -no indication for ischemic testing and CP has resolved, will reassess TTE and likely dc w outpt HCM follow-up  -HCM/Jayson recs pending    30 minutes were spent on this encounter for extensive review of medical record details including labs and/or imaging studies and/or adjacent care team and consultant records, as well as review and reconciliation of current medications. Time was spent on obtaining a history, performing physical examination of patient, and answering patient and/or family questions regarding plan of care. Time was also spent discussing plan of care with patient’s other care team members including primary and consulting teams. Time also was spent on documentation of this encounter into the EHR.    Skip Lau MD, MPhil, [facility]  Cardiologist, [facility]  ; [facility] and [facility]  Email: [email or web address] and Cardiovascular Surgery on-service contact/call information, go to [email or web address] and use "[employer]" to login.  Outpatient Cardiology appointments, call [phone] to arrange with a colleague; I do not have outpatient Cardiology clinic.

## 2024-10-22 NOTE — ED PROVIDER NOTE - PHYSICAL EXAMINATION
PHYSICAL EXAM:  GENERAL: NAD, well-developed  HEAD:  Atraumatic, Normocephalic  EYES: EOMI, PERRLA, conjunctiva and sclera clear  NECK: Supple, No JVD  CHEST/LUNG: Clear to auscultation bilaterally; No wheeze  HEART: Regular rate and rhythm; s1+ s2+, crescendo-decrescendo mid systolic murmur along left sternal border before S2  ABDOMEN: Soft, Nontender, Nondistended; Bowel sounds present  EXTREMITIES:, No clubbing, cyanosis, or edema  NEUROLOGY:AAOx3 non-focal  SKIN: No rashes or lesions

## 2024-10-22 NOTE — H&P ADULT - PROBLEM SELECTOR PLAN 3
Has been on Glimepiride and Metformin at home  - will start ISS, hold all oral agents  - CC diet  - A1C in am  - UA for proteinuria, Scr 1.7  - c/w Losartan at home dose

## 2024-10-22 NOTE — H&P ADULT - MUSCULOSKELETAL
details… ROM intact/no joint swelling/no calf tenderness/normal gait/strength 5/5 bilateral upper extremities/strength 5/5 bilateral lower extremities

## 2024-10-22 NOTE — ED PROVIDER NOTE - PROGRESS NOTE DETAILS
Mario Love, DO: Troponin stable at 18 x 2, EKG shows ST elevations in 3 and aVF which were not seen previously along with the T wave inversion and depression aVL, slight version in V2 and V1 as well, cardiology consulted, no focal wall abnormalities on bedside POCUS, patient is still without any pain or complaints at this time.  Recommendation is to give aspirin and admit to telemetry for further workup, patient reiterates that she is still without symptoms.  Possible ACS, possible pericarditis, no pericardial effusion appreciated on bedside POCUS. Jovita Larsen MD (Attending MD): Patient signed out to me.

## 2024-10-22 NOTE — ED ADULT NURSE NOTE - BREATHING, MLM
Shortness of breath could indicate a medical problem. Causes include lung diseases, heart disease, low amount of red blood cells (anemia), poor physical fitness, being overweight, smoking, etc. Your health care provider may not be able to find a cause for your shortness of breath after your exam. In this case, it is important to have a follow-up exam with your primary care physician as instructed. If medicines were prescribed, take them as directed for the full length of time directed. Refrain from tobacco products.    SEEK IMMEDIATE MEDICAL CARE IF YOU HAVE THE FOLLOWING SYMPTOMS: worsening shortness of breath, chest pain, back pain, abdominal pain, fever, coughing up blood, lightheadedness/dizziness.
Spontaneous, unlabored and symmetrical

## 2024-10-22 NOTE — ED ADULT NURSE NOTE - NSFALLUNIVINTERV_ED_ALL_ED
Bed/Stretcher in lowest position, wheels locked, appropriate side rails in place/Call bell, personal items and telephone in reach/Instruct patient to call for assistance before getting out of bed/chair/stretcher/Non-slip footwear applied when patient is off stretcher/[place] to call system/Physically safe environment - no spills, clutter or unnecessary equipment/Purposeful proactive rounding/Room/bathroom lighting operational, light cord in reach

## 2024-10-22 NOTE — ED ADULT TRIAGE NOTE - SOURCE OF INFORMATION
Chief Complaint   Patient presents with    Back Pain    Office Visit     Date of Injury: 2 years   Initial Treatment Date: 5/29/2024    Date Last Seen: 06/19/2024  Mechanism of Onset: Gradual   Occupation: Retired   Referred by:Zak Mcmahon DC  Primary Care Physician: Delgado Ramos MD  Date informed consent signed: 5/29/2024  Initial pain rating: 10/10  Visit Number of Current Episode: 10  Discharged from care: No      SUBJECTIVE:  The patient is a pleasant 81 year old male that presents to our office today for an evaluation and treatment of mid/upper back and low back pain.    Patient rates his pain today at 0/10 with 10 being worst, and states that the pain is constantly (% of the time).  he describes the pain as stabbing and shooting.    Patient is here for his follow up and states that his back does not hurt. He states that when walking or bending over he does feel discomfort in his low back and states as long as he has he is using his elevated-standing walker his back pain fine.      CHIROPRACTIC PATIENT HISTORY:     On roughly what date did your present pain start: 2 years ago   How did your pain start?  Gradually  Over time are your symptoms:Getting worse   Rate how bad your symptoms are; 0 being no pain and 10 unbearable at their:  Worst 10  Best 0  Patient states that the pain is worse when: standing, sitting, and walking  Patient states to reduce pain he has tried heat and ice  Patient has sought chiropractic treatment in the past 25+years ago    Patient’s current work status: Retired   Are you still working? No  How much does your job require you to lift? N/albs.  Are you totally disabled from work? N/a   Are you on work restrictions?retired    Diagnostic studies of area of concern:  X-ray lumbar spine 05/20/2024  Narrative & Impression   XR LUMBAR SPINE 2 OR 3 VIEWS     CLINICAL INFORMATION:  81 years-old Male with history of chronic low back  pain, midline.     COMPARISON: MRI 4/4/2019.  Radiographs 8/15/2018     FINDINGS /      IMPRESSION:     1.  Five lumbar-type vertebral bodies.  2.  No acute osseous abnormality.  3.  Chronic mild anterior wedging of L2 and L3. No acute compression  fracture by radiograph. Diffuse osseous demineralization.  4.  Straightening of the lumbar lordosis. Trace retrolisthesis L2 upon L3.  Slight levoconvex curvature lower lumbar spine.  5.  Severe disc height loss at L2-L3 and L3-L4. Moderate disc height loss  at L5-S1. Multilevel endplate osteophytes and facet arthropathy.  6.  Bilateral ureteral stents.     Electronically Signed by: Alfonso Tavares DO  Signed on: 5/29/2024 11:56 AM  Created on Workstation ID: [id number]  Signed on Workstation ID: [id number]     MRI lumbar spine 04/04/2019  EXAM:  MRI LUMBAR SPINE WO CONTRAST     CLINICAL INDICATION: 76 years-old Male, presenting history of SPINAL  STENOSIS, LUMBAR.     COMPARISON:  MRI lumbar spine 11/26/2018.     TECHNIQUE:  Using a 1.5 Paige imaging system, MRI of the lumbar spine  utilizing axial and sagittal T1- and T2-weighted, sagittal STIR and  opposed-phase imaging without gadolinium.     FINDINGS:     Postsurgical Findings:  Post surgical changes from L3-L4 laminectomies.     Vertebrae: 5 lumbar-type vertebral bodies are present.  Vertebral body  heights are preserved.   Normal alignment.       Marrow:  Endplate degenerative marrow signal changes at multiple levels,  advanced at L3-L4, similar to the prior study. Small endplate osteophytes  at multiple levels. Marrow edema at L3-L4 is also not significant changed,  likely degenerative in etiology. A STIR hyperintense focus in the T11  vertebral body with peripheral T1 hyperintensity and loss of signal on  opposed phase imaging is unchanged and likely represents an atypical  osseous hemangioma. No destructive osseous lesions.     Discs:  Loss of signal and severe disc height loss at L2-L3, L3-L4 and  L5-S1, unchanged.     Spinal Canal:  Conus medullaris  terminates at L1.  Distal cord and nerve  roots are of normal signal intensity.      Paraspinal soft tissues:  Marked fatty atrophy of the paraspinal  musculature.     Degenerative changes:     T12-L1:  No significant disc herniation or central canal stenosis.  No  significant neural foraminal compromise.     L1-L2: Mild, diffuse disc bulge with mild bilateral facet hypertrophy  resulting in minimal left neural foraminal narrowing.  No spinal canal  stenosis or right neural foraminal narrowing.     L2-L3:  Mild to moderate, diffuse disc bulge with mild to moderate  bilateral facet hypertrophy and prominence of ligamentum flavum resulting  in mild effacement of the ventral thecal sac and moderate to severe  bilateral neural foraminal narrowing, unchanged.          L3-L4:  Status post posterior decompression. Moderate, diffuse disc bulge  with moderate bilateral facet hypertrophy resulting in mild left lateral  recess narrowing and moderate to severe right and severe left neural  foraminal narrowing.          L4-L5:  Mild to moderate disc bulge asymmetric to the right as well as mild  to moderate bilateral facet hypertrophy resulting in mild to moderate right  and moderate left neural foraminal narrowings.  No spinal canal stenosis.     L5-S1:  Mild, diffuse disc bulge with moderate bilateral facet hypertrophy  resulting in moderate to severe right and moderate left neural foraminal  narrowing.  No spinal canal stenosis.      Other: A thinly septated T2 hyperintensity in the spleen is incompletely  evaluated, but favored to be benign. A 1.4 cm T2 hyperintensity in the  right kidney was also present on the prior study, likely representing a  cyst.        IMPRESSION:       1.  Multilevel degenerative changes, as described. No significant spinal  canal stenosis. At L2-L3, there is moderate to severe bilateral neural  foraminal narrowing. At L3-L4, there is moderate to severe right and severe  left neural foraminal  narrowings. At L5-S1, there is moderate to severe  right and moderate left neural foraminal narrowings. These are not  significantly changed from the prior study.     2.  Postsurgical changes from L3-L4 laminectomies.     Hospitalizations:  None      HEALTH HISTORY:      HTN (hypertension)                                            Dyslipidemia                                                  Hypothyroid                                                   Low testosterone                                              Other and unspecified hyperlipidemia                          RAD (reactive airway disease) (CMD)                           Diabetes mellitus  (CMD)                                        Comment: diet controlled    Chronic pain                                                  Arthritis                                                     Pneumonia                                                     Gastroesophageal reflux disease                               Hepatitis A                                                   Fracture                                                        Comment: right femur    Personal history of poliomyelitis                             Sleep apnea                                                     Comment: CPAP +9, FFM    COPD (chronic obstructive pulmonary disease)  *               Knee gives out, left                                          Rotator cuff tear arthropathy of right shoulder 08/31/2021    Closed dislocation of left shoulder             09/23/2021  The patient's family health history includes:  None    SOCIAL HISTORY:  Patient completed Chiropractic Patient History and Chiropractic Systems Review.  These were reviewed with the patient.    OBJECTIVE FINDINGS:     Objective Rating Index is    Patient Emotional screening was completed 5/29/2024; [employer] Pain Supplemental Questionnaire score was 8/40.    OWS:34%  NDI: 20%    During the past 24 hours, how has pain  EMS interfered with normal activities: 5/10  During the past 24 hours, how has pain interfered with your sleep: 0/10  During the past 24 hours, how has pain affected your mood: 2/10  During the past 24 hours, how has pain contributed to your stress: 1/10    Problem focus examination revealed:    (L & P-spine) Lumbar spine facet joint function is within normal limits except for his L5 and S1  facet joints that exhibited limited passive range of motion and segmental restriction and tenderness on palpation; sacroiliac joint function is within normal limits except for his right sacroiliac joint that exhibited limited passive range of motion and joint restriction; The following muscles were examined for flexibility and tone at rest; right piriformis, left piriformis, right hamstring, left hamstring, right lumbar paraspinals, left lumbar paraspinals, right quadriceps, and left quadriceps. These muscles were found to be within normal limits except for hisright piriformis, left piriformis, right lumbar paraspinals, and left lumbar paraspinals muscle(s) that exhibited limited flexibility and were hypertonic at rest.    Orthopedic/Neurological tests:  There were no vitals taken for this visit.  None    Assessment:    1. Somatic dysfunction of lumbar region    2. Chronic bilateral low back pain with left-sided sciatica    3. Somatic dysfunction of sacral region    4. Muscle spasm    5. Somatic dysfunction of pelvis region    6. DDD (degenerative disc disease), lumbar    7. Facet hypertrophy of lumbar region    8. Foot drop, left                            Plan:  Patient was evaluated and then treated with manipulation to his  lumbar and pelvic spine via nickerson drop technique, lumbar and sacral spine via Diop distraction technique to improve function and passive range of motion of facet joints. Patient also treated with contract/relax stretch to muscle noted as taut in objective findings to improve flexibility and decrease strain  to spinal structures. Patient also treated with lumbar distraction x 5 minutes to stretch lumbar paraspinal muscle and centralize possible contained migration of lumbar intervertebral nucleus.    Rehabilitation/Modalities:  None      Goal of care is to improve muscular and skeletal function and provide symptom relief. Patient responded well to the treatment today. Patient rates pain at 0/10 immediately after the treatment today. Patient is to return in 3-5 days for continued care and treatment. Care is planned at 3x /week x 2 weeks, 2x/week for 2 weeks, 1x/week for 2 weeks.     Total exam and treatment time was 10 minutes.

## 2024-10-22 NOTE — H&P ADULT - NSICDXPASTMEDICALHX_GEN_ALL_CORE_FT
PAST MEDICAL HISTORY:  DM2 (diabetes mellitus, type 2)     HLD (hyperlipidemia)     HOCM (hypertrophic obstructive cardiomyopathy)     HTN (hypertension)

## 2024-10-22 NOTE — ED PROVIDER NOTE - CADM POA CENTRAL LINE
Denies fever or urinary frequency or burning sensation. History CVA    Onset: yesterday  Location / description: bladder  Precipitating Factors: darker color urine, questionable if red tinged at times, intermittent  mild  lower abdominal cramping, none today. Intermittent vaginal itching, none today  Pain Scale (1-10), 10 highest: 00/10  Associated Symptoms:darker color urine yesterday,   What improves / worsens symptoms: nothing  Symptom specific medications: none  LMP : No LMP recorded. Patient is postmenopausal.  Are you pregnant or breast feeding: n/a  Recent visits (last 3-4 weeks) for same reason or recent surgery: denies    PLAN: 24 hour disposition, requesting message to office for urine test, offered Walk in Care declined for now. Please call patient to discuss further.   See/Call Provider within 24 hours    Patient/Caller refuses to follow recommendations.    Reason for Disposition  • Blood in urine  (Exception: could be normal menstrual bleeding)    Protocols used: URINE - BLOOD IN-A-       No

## 2024-10-22 NOTE — ED PROVIDER NOTE - OBJECTIVE STATEMENT
68F PMH HTN, DM2, HLD, HOCM (EF 73% 2023) presented for dyspnea. Pt reports 1 day ago she had CP during the night time that radiated to he shoulder and back. The pain resolved with time and rest. the following day pt states she had a late meal at 10 pm and developed overall discomfort. she went to check her BP and it was high at 164/102 w/. Pt states she had ringing in her ears and palpitations. Pt felt scared and short of breath. she decided to call an ambulance. when an ambulance arrived all her sxs had resolved, but she was encouraged by EMT to come in to the hospital for a check. Pt also states she has severe deconditioning and is not able to walk anywhere except inside her home. Pt reports she can only drive since 2019. During an encounter, pt denies all ROS questions. Pt denies CP, SOB, palpitations, diaphoresis, lower extremity swelling, n/v/d/c, f/c, abd pain, HA.

## 2024-10-22 NOTE — H&P ADULT - ASSESSMENT
The patient is a 68 year old Female with PMH of HCM, HTN, T2DM, HLD, obesity came to ED with c/o intermittent chest pain occurring at rest, pain occurred 2 nights iin a row, stabbing, pleuritic L sided, lasted several hrs at night, worse with breathing, non-radiating, not associated with N/V, sweats, no fever, recent travel or leg swelling. She rested all night and pain went away, didn't take any medications to relieve the pain. Has been seeing her cardiologist- last visit a month ago, had echo then but didn't change any medications.     In ED she remained afebrile, /77, O2 sat 98%, CXR clear, EKG- Tinv in 1, AVL, MORIS in inf, lateral leads, Trops 12, 16, got 1 dose ASA 324mg po. Cardiology evaluated.   The patient is a 68 year old Female with PMH of HCM, HTN, T2DM, HLD, obesity came to ED with c/o intermittent chest pain occurring at rest, pain occurred 2 nights iin a row, stabbing, pleuritic L sided, lasted several hrs at night, worse with breathing, non-radiating, not associated with N/V, sweats, no fever, recent travel or leg swelling. She rested all night and pain went away, didn't take any medications to relieve the pain. Has been seeing her cardiologist- last visit a month ago, had echo then but didn't change any medications.     In ED she remained afebrile, /77, O2 sat 98%, CXR clear, EKG- Tinv in 1, AVL, MORIS in inf, lateral leads ( new changes), Trops 18, 16, got 1 dose ASA 324mg po. Cardiology evaluated.

## 2024-10-22 NOTE — ED PROVIDER NOTE - ATTENDING CONTRIBUTION TO CARE
Mario Love, DO: I have personally performed a face to face medical and diagnostic evaluation of the patient. I have discussed with and reviewed the Resident's and/or ACP's and/or Medical/PA/NP student's note and agree with the History, ROS, Physical Exam and MDM unless otherwise indicated. A brief summary of my personal evaluation and impression can be found below.     68F PMH HTN, DM2, HLD, HOCM (EF 73% 2023), SRIKANTH,  presented for dyspnea. Pt reports 1 day ago she had CP during the night time that radiated to he shoulder and back. The pain resolved with time and rest. the following day pt states she had a late meal at 10 pm and developed overall discomfort. she went to check her BP and it was high at 164/102 w/. Pt states she had ringing in her ears and palpitations. Pt felt scared and short of breath. she decided to call an ambulance. when an ambulance arrived all her sxs had resolved, but she was encouraged by EMT to come in to the hospital for a check. Pt also states she has severe deconditioning and is not able to walk anywhere except inside her home. Patient states that she has close follow-up with her cardiologist outpatient although cannot remember the name and states that she had several stress tests earlier this year as well as echocardiograms.  Last echocardiogram on file from 2023.    CONSTITUTIONAL: NAD  SKIN: Warm dry, normal skin turgor  HEAD: NCAT  EYES: EOMI, PERRLA, no scleral icterus, conjunctiva pink  NECK: Supple; non tender. Full ROM.  CARD: RRR  RESP: No respiratory distress  ABD: non-distended  MSK: Full ROM, no leg swelling  PSYCH: Cooperative, appropriate.    Given that patient's chest pain resolved ACS not likely, will get troponins EKG.  PE less likely is patient has normal vital signs without complaints right now, less likely infectious as patient has no ongoing chest pain no cough no fevers chills.  Patient's pain is not reproducible less likely pleuritis pneumothorax aortic pathology.  Patient is without symptoms and asking to go but is amenable to chest pain workup in the ED.  Will get chest x-ray labs, dispo per recommendations with history of HOCM and SRIKANTH, concern for possible structural obstruction causing patient's transient symptoms although may have resolved once hemodynamics returned will give IV fluids, consult cardiology, dispo per cardiology recommendations.

## 2024-10-22 NOTE — H&P ADULT - NEUROLOGICAL
details… AAOx3, nonfocal/cranial nerves II-XII intact/sensation intact/responds to pain/responds to verbal commands/deep reflexes intact/superficial reflexes intact/upper extremity  reflex

## 2024-10-22 NOTE — CONSULT NOTE ADULT - SUBJECTIVE AND OBJECTIVE BOX
Cardiology Consult Note   [Please check [email or web address] password: [email or web address]" for cardiology service schedule and contact information]    HPI:  Sheryl Montgomery is a 68 year old Female with PMH of HCM (diagnosed by TTE 10/2023, does not know much about her diagnosis), HTN, NIDDM2, HLD who comes in for 2 days of intermittent chest pain occurring at rest, described as 10 minutes or less of stabbing pleuritic midsternal pain that occurred without trigger, radiating to her back and shoulder. She did not do anything for it the first time because it went away, however last night (8 hours ago) the chest pain recurred so she decided to come to the ED. Symptoms had all resolved by the time EMS arrived at her home. Of note, she also has a history of severe deconditioning and does not really leave her home. She denies any dyspnea, n/v/abdominal pain, recent illnesses, cough, fevers, leg pain or swelling. She has no history of autoimmune diseases.     She states that she was told she had a "thick" heart but did not know the term HCM and did not follow up with the cardiologist she was scheduled to here at [facility], but she did see someone at [facility] once after the October 2023 admission but did not follow up.     She arrived to the ED around 230 AM. Vitals showed /70s, HR 80s satting 97% on RA. She was asymptomatic on arrival and continued to be. Troponin x2 were <20. Cardiology was called and requested an ECG.    Initial ECG was performed at 630 am which showed MORIS in II, III, aVF, minimal but present MORIS in V1, V2, ?V4, ST depressions and TWI in I, aVL. Called interventionalist and discussed case with him, given asymptomatic for several hours, negative troponins x2 hours apart, and pleuritic nature of pain, not a STEMI but should get some ischemic evaluation inpatient.     Social History: no EtOH, illicit drug, or tobacco use. Lives alone, son is nearby. Eats "okay" but watches her sugars.  Medications:   -zetia (can't tolerate statin due to myopathy)  -glimiperide  -losartan 50-HCTZ 12.5 mg daily  - metoprolol unknown dose  - metformin BID     PAST MEDICAL & SURGICAL HISTORY:  DM2 (diabetes mellitus, type 2)      HTN (hypertension)      S/P appendectomy        FAMILY HISTORY:    SOCIAL HISTORY:  unchanged    MEDICATIONS:  aspirin  chewable 324 milliGRAM(s) Oral once                    -------------------------------------------------------------------------------------------  PHYSICAL EXAM:  T(C): 36.6 (10-22-24 @ 04:32), Max: 36.6 (10-22-24 @ 04:32)  HR: 84 (10-22-24 @ 04:32) (84 - 92)  BP: 145/71 (10-22-24 @ 04:32) (136/77 - 145/71)  RR: 18 (10-22-24 @ 04:32) (18 - 20)  SpO2: 97% (10-22-24 @ 04:32) (96% - 97%)  Wt(kg): --  I&O's Summary      GENERAL: Patient is alert, awake, and in no acute distress  HEENT: Moist mucous membranes. No conjunctival injection. No JVD  CHEST/LUNG: Clear to auscultation bilaterally; No wheezing. Unlabored respirations.  HEART: Regular rate and rhythm; No murmurs. Radial pulses 2+.  ABDOMEN: Bowel sounds present; Soft, Nontender, Nondistended.   EXTREMITIES:  No lower extremity tenderness. No lower extremity edema.  NEURO: Aox3, no focal deficits    -------------------------------------------------------------------------------------------  LABS:                          11.7   6.96  )-----------( 219      ( 22 Oct 2024 03:52 )             37.4     10-22    137  |  105  |  34[H]  ----------------------------<  232[H]  4.4   |  19[L]  |  1.70[H]    Ca    9.7      22 Oct 2024 03:52    TPro  7.2  /  Alb  3.9  /  TBili  0.2  /  DBili  x   /  AST  13  /  ALT  15  /  AlkPhos  80  10-22      CARDIAC MARKERS ( 22 Oct 2024 05:19 )  18 ng/L / x     / x     / x     / x     / x      CARDIAC MARKERS ( 22 Oct 2024 03:52 )  17 ng/L / x     / x     / x     / x     / x                  -------------------------------------------------------------------------------------------  TTE from 10/2023:     1. Left ventricular cavity is normally sized. Left ventricular wall thickness is mildly increased. Left ventricular systolic function is normal with an ejection fraction of 73 % by Alonso's method of disks.   2. Hypertrophic obstructive cardiomyopathy (HOCM).   3. Severe discretebasal septal hypertrophy.   4. The left ventricular outflow tract resting gradient is 108 mmHg and 160 mmHg using the Valsalva maneuver.   Cardiology Consult Note   [Please check [email or web address] password: [email or web address]" for cardiology service schedule and contact information]    HPI:  Sheryl Montgomery is a 68 year old Female with PMH of HCM (diagnosed by TTE 10/2023, does not know much about her diagnosis), HTN, NIDDM2, HLD who comes in for 2 days of intermittent chest pain occurring at rest, described as 10 minutes or less of stabbing pleuritic midsternal pain that occurred without trigger, radiating to her back and shoulder. She did not do anything for it the first time because it went away, however last night (8 hours ago) the chest pain recurred so she decided to come to the ED. Symptoms had all resolved by the time EMS arrived at her home. Of note, she also has a history of severe deconditioning and does not really leave her home. She denies any dyspnea, n/v/abdominal pain, recent illnesses, cough, fevers, leg pain or swelling. She has no history of autoimmune diseases.     She states that she was told she had a "thick" heart but did not know the term HCM and did not follow up with the cardiologist she was scheduled to here at [facility], but she did see someone at [facility] once after the October 2023 admission but did not follow up.     She arrived to the ED around 230 AM. Vitals showed /70s, HR 80s satting 97% on RA. She was asymptomatic on arrival and continued to be. Troponin x2 were <20. Cardiology was called and requested an ECG.    Initial ECG was performed at 630 am which showed MORIS in II, III, aVF, minimal but present MORIS in V1, V2, ?V4, ST depressions and TWI in I, aVL. Called interventionalist and discussed case with him, given asymptomatic for several hours, negative troponins x2 hours apart, and pleuritic nature of pain, not a STEMI but should get some ischemic evaluation inpatient. Bedside POCUS done by ED attending - likely normal EF, no obvious WMA, no pericardial effusion. Thick hypertrophied walls.    Social History: no EtOH, illicit drug, or tobacco use. Lives alone, son is nearby. Eats "okay" but watches her sugars.  Medications:   -zetia (can't tolerate statin due to myopathy)  -glimiperide  -losartan 50-HCTZ 12.5 mg daily  - metoprolol unknown dose  - metformin BID     PAST MEDICAL & SURGICAL HISTORY:  DM2 (diabetes mellitus, type 2)      HTN (hypertension)      S/P appendectomy        FAMILY HISTORY:    SOCIAL HISTORY:  unchanged    MEDICATIONS:  aspirin  chewable 324 milliGRAM(s) Oral once                    -------------------------------------------------------------------------------------------  PHYSICAL EXAM:  T(C): 36.6 (10-22-24 @ 04:32), Max: 36.6 (10-22-24 @ 04:32)  HR: 84 (10-22-24 @ 04:32) (84 - 92)  BP: 145/71 (10-22-24 @ 04:32) (136/77 - 145/71)  RR: 18 (10-22-24 @ 04:32) (18 - 20)  SpO2: 97% (10-22-24 @ 04:32) (96% - 97%)  Wt(kg): --  I&O's Summary      GENERAL: Patient is alert, awake, and in no acute distress  HEENT: Moist mucous membranes. No conjunctival injection. No JVD  CHEST/LUNG: Clear to auscultation bilaterally; No wheezing. Unlabored respirations.  HEART: Regular rate and rhythm; No murmurs. Radial pulses 2+.  ABDOMEN: Bowel sounds present; Soft, Nontender, Nondistended.   EXTREMITIES:  No lower extremity tenderness. No lower extremity edema.  NEURO: Aox3, no focal deficits    -------------------------------------------------------------------------------------------  LABS:                          11.7   6.96  )-----------( 219      ( 22 Oct 2024 03:52 )             37.4     10-22    137  |  105  |  34[H]  ----------------------------<  232[H]  4.4   |  19[L]  |  1.70[H]    Ca    9.7      22 Oct 2024 03:52    TPro  7.2  /  Alb  3.9  /  TBili  0.2  /  DBili  x   /  AST  13  /  ALT  15  /  AlkPhos  80  10-22      CARDIAC MARKERS ( 22 Oct 2024 05:19 )  18 ng/L / x     / x     / x     / x     / x      CARDIAC MARKERS ( 22 Oct 2024 03:52 )  17 ng/L / x     / x     / x     / x     / x                  -------------------------------------------------------------------------------------------  TTE from 10/2023:     1. Left ventricular cavity is normally sized. Left ventricular wall thickness is mildly increased. Left ventricular systolic function is normal with an ejection fraction of 73 % by Alonso's method of disks.   2. Hypertrophic obstructive cardiomyopathy (HOCM).   3. Severe discretebasal septal hypertrophy.   4. The left ventricular outflow tract resting gradient is 108 mmHg and 160 mmHg using the Valsalva maneuver.   none

## 2024-10-22 NOTE — H&P ADULT - NSHPSOCIALHISTORY_GEN_ALL_CORE
Lives alone,   several years ago with MI, has 2 grown sons  Denies smoking cigarettes, drinking alcohol

## 2024-10-22 NOTE — H&P ADULT - HISTORY OF PRESENT ILLNESS
The patient is a 68 year old Female with PMH of HCM, HTN, T2DM, HLD, obesity came to ED with c/o intermittent chest pain occurring at rest, described as 10 minutes or less of stabbing pleuritic midsternal pain that occurred without trigger, radiating to her back and shoulder. She did not do anything for it the first time because it went away, however last night (8 hours ago) the chest pain recurred so she decided to come to the ED. Symptoms had all resolved by the time EMS arrived at her home. Of note, she also has a history of severe deconditioning and does not really leave her home. She denies any dyspnea, n/v/abdominal pain, recent illnesses, cough, fevers, leg pain or swelling. She has no history of autoimmune diseases.     She states that she was told she had a "thick" heart but did not know the term HCM and did not follow up with the cardiologist she was scheduled to here at [facility], but she did see someone at [facility] once after the October 2023 admission but did not follow up.     She arrived to the ED around 230 AM. Vitals showed /70s, HR 80s satting 97% on RA. She was asymptomatic on arrival and continued to be. Troponin x2 were <20. Cardiology was called and requested an ECG.    Initial ECG was performed at 630 am which showed MORIS in II, III, aVF, minimal but present MORIS in V1, V2, ?V4, ST depressions and TWI in I, aVL. Called interventionalist and discussed case with him, given asymptomatic for several hours, negative troponins x2 hours apart, and pleuritic nature of pain, not a STEMI but should get some ischemic evaluation inpatient. Bedside POCUS done by ED attending - likely normal EF, no obvious WMA, no pericardial effusion. Thick hypertrophied walls.     The patient is a 68 year old Female with PMH of HCM, HTN, T2DM, HLD, obesity came to ED with c/o intermittent chest pain occurring at rest, pain occurred 2 nights iin a row, stabbing, pleuritic L sided, lasted several hrs at night, worse with breathing, non-radiating, not associated with N/V, sweats, no fever, recent travel or leg swelling. She rested all night and pain went away, didn't take any medications to relieve the pain.     Has been seeing her cardiologist- last visit a month ago, had echo then but didn't change any medications.

## 2024-10-23 ENCOUNTER — NON-APPOINTMENT (OUTPATIENT)
Age: 69
End: 2024-10-23

## 2024-10-23 ENCOUNTER — TRANSCRIPTION ENCOUNTER (OUTPATIENT)
Age: 69
End: 2024-10-23

## 2024-10-23 VITALS
HEART RATE: 87 BPM | DIASTOLIC BLOOD PRESSURE: 76 MMHG | OXYGEN SATURATION: 97 % | RESPIRATION RATE: 18 BRPM | SYSTOLIC BLOOD PRESSURE: 117 MMHG | TEMPERATURE: 98 F

## 2024-10-23 LAB
A1C WITH ESTIMATED AVERAGE GLUCOSE RESULT: 8.3 % — HIGH (ref 4–5.6)
C3 SERPL-MCNC: 155 MG/DL — SIGNIFICANT CHANGE UP (ref 81–157)
C4 SERPL-MCNC: 35 MG/DL — SIGNIFICANT CHANGE UP (ref 13–39)
CHOLEST SERPL-MCNC: 235 MG/DL — HIGH
ESTIMATED AVERAGE GLUCOSE: 192 MG/DL — HIGH (ref 68–114)
GLUCOSE BLDC GLUCOMTR-MCNC: 200 MG/DL — HIGH (ref 70–99)
GLUCOSE BLDC GLUCOMTR-MCNC: 213 MG/DL — HIGH (ref 70–99)
GLUCOSE BLDC GLUCOMTR-MCNC: 302 MG/DL — HIGH (ref 70–99)
HDLC SERPL-MCNC: 42 MG/DL — LOW
LIPID PNL WITH DIRECT LDL SERPL: 114 MG/DL — HIGH
NON HDL CHOLESTEROL: 193 MG/DL — HIGH
RHEUMATOID FACT SERPL-ACNC: <10 IU/ML — SIGNIFICANT CHANGE UP (ref 0–13)
TRIGL SERPL-MCNC: 454 MG/DL — HIGH
TSH SERPL-MCNC: 2.39 UIU/ML — SIGNIFICANT CHANGE UP (ref 0.27–4.2)

## 2024-10-23 PROCEDURE — 85379 FIBRIN DEGRADATION QUANT: CPT

## 2024-10-23 PROCEDURE — 71045 X-RAY EXAM CHEST 1 VIEW: CPT

## 2024-10-23 PROCEDURE — 96372 THER/PROPH/DIAG INJ SC/IM: CPT

## 2024-10-23 PROCEDURE — 84443 ASSAY THYROID STIM HORMONE: CPT

## 2024-10-23 PROCEDURE — 82962 GLUCOSE BLOOD TEST: CPT

## 2024-10-23 PROCEDURE — 86140 C-REACTIVE PROTEIN: CPT

## 2024-10-23 PROCEDURE — 84295 ASSAY OF SERUM SODIUM: CPT

## 2024-10-23 PROCEDURE — 84484 ASSAY OF TROPONIN QUANT: CPT

## 2024-10-23 PROCEDURE — 99232 SBSQ HOSP IP/OBS MODERATE 35: CPT

## 2024-10-23 PROCEDURE — 86255 FLUORESCENT ANTIBODY SCREEN: CPT

## 2024-10-23 PROCEDURE — 99239 HOSP IP/OBS DSCHRG MGMT >30: CPT

## 2024-10-23 PROCEDURE — 84132 ASSAY OF SERUM POTASSIUM: CPT

## 2024-10-23 PROCEDURE — 93306 TTE W/DOPPLER COMPLETE: CPT

## 2024-10-23 PROCEDURE — 86038 ANTINUCLEAR ANTIBODIES: CPT

## 2024-10-23 PROCEDURE — 83605 ASSAY OF LACTIC ACID: CPT

## 2024-10-23 PROCEDURE — 80061 LIPID PANEL: CPT

## 2024-10-23 PROCEDURE — 86160 COMPLEMENT ANTIGEN: CPT

## 2024-10-23 PROCEDURE — 80053 COMPREHEN METABOLIC PANEL: CPT

## 2024-10-23 PROCEDURE — 99285 EMERGENCY DEPT VISIT HI MDM: CPT

## 2024-10-23 PROCEDURE — 85652 RBC SED RATE AUTOMATED: CPT

## 2024-10-23 PROCEDURE — 82330 ASSAY OF CALCIUM: CPT

## 2024-10-23 PROCEDURE — 82947 ASSAY GLUCOSE BLOOD QUANT: CPT

## 2024-10-23 PROCEDURE — 83036 HEMOGLOBIN GLYCOSYLATED A1C: CPT

## 2024-10-23 PROCEDURE — 82435 ASSAY OF BLOOD CHLORIDE: CPT

## 2024-10-23 PROCEDURE — 86036 ANCA SCREEN EACH ANTIBODY: CPT

## 2024-10-23 PROCEDURE — 86431 RHEUMATOID FACTOR QUANT: CPT

## 2024-10-23 PROCEDURE — 85014 HEMATOCRIT: CPT

## 2024-10-23 PROCEDURE — 82803 BLOOD GASES ANY COMBINATION: CPT

## 2024-10-23 PROCEDURE — 85025 COMPLETE CBC W/AUTO DIFF WBC: CPT

## 2024-10-23 PROCEDURE — 83880 ASSAY OF NATRIURETIC PEPTIDE: CPT

## 2024-10-23 PROCEDURE — 85018 HEMOGLOBIN: CPT

## 2024-10-23 RX ORDER — DILTIAZEM HCL 5 MG/ML
1 VIAL (ML) INTRAVENOUS
Qty: 30 | Refills: 0
Start: 2024-10-23 | End: 2024-11-21

## 2024-10-23 RX ORDER — ASPIRIN/MAG CARB/ALUMINUM AMIN 325 MG
1 TABLET ORAL
Qty: 30 | Refills: 0
Start: 2024-10-23 | End: 2024-11-21

## 2024-10-23 RX ORDER — PANTOPRAZOLE SODIUM 40 MG/1
1 TABLET, DELAYED RELEASE ORAL
Qty: 30 | Refills: 0
Start: 2024-10-23 | End: 2024-11-21

## 2024-10-23 RX ORDER — INFLUENZ VIR VAC TV P-SURF2003 15MCG/.5ML
0.5 SYRINGE (ML) INTRAMUSCULAR ONCE
Refills: 0 | Status: DISCONTINUED | OUTPATIENT
Start: 2024-10-23 | End: 2024-10-23

## 2024-10-23 RX ORDER — LOSARTAN POTASSIUM AND HYDROCHLOROTHIAZIDE 50; 12.5 MG/1; MG/1
1 TABLET, FILM COATED ORAL
Refills: 0 | DISCHARGE

## 2024-10-23 RX ORDER — LOSARTAN POTASSIUM 25 MG/1
1 TABLET ORAL
Qty: 30 | Refills: 0
Start: 2024-10-23 | End: 2024-11-21

## 2024-10-23 RX ADMIN — Medication 81 MILLIGRAM(S): at 11:10

## 2024-10-23 RX ADMIN — Medication 1: at 08:57

## 2024-10-23 RX ADMIN — HEPARIN SODIUM 5000 UNIT(S): 10000 INJECTION INTRAVENOUS; SUBCUTANEOUS at 05:20

## 2024-10-23 RX ADMIN — Medication 0.6 MILLIGRAM(S): at 05:19

## 2024-10-23 RX ADMIN — PANTOPRAZOLE SODIUM 40 MILLIGRAM(S): 40 TABLET, DELAYED RELEASE ORAL at 07:06

## 2024-10-23 RX ADMIN — HEPARIN SODIUM 5000 UNIT(S): 10000 INJECTION INTRAVENOUS; SUBCUTANEOUS at 13:41

## 2024-10-23 RX ADMIN — LOSARTAN POTASSIUM 25 MILLIGRAM(S): 25 TABLET ORAL at 05:20

## 2024-10-23 RX ADMIN — Medication 4: at 13:38

## 2024-10-23 RX ADMIN — Medication 25 MILLIGRAM(S): at 05:19

## 2024-10-23 NOTE — CONSULT NOTE ADULT - NS PANP COMMENT GEN_ALL_CORE FT
Cardiology Attending, HCM Service:    I personally interviewed and examined Ms. Sheryl Montgomery with DANITA Lipscomb, and agree with her assessment and recommendations. Ms. Montgomery has obstructive hypertrophic cardiomyopathy with a severely elevated unprovoked LVOT gradient. She would likely be a candidate for treatment with a cardiac myosin inhibitor, the details about which can be discussed in HCM clinic as an outpatient.     Raimundo Tyler MD, FACC, FASE  Director, HCM Service  [facility]

## 2024-10-23 NOTE — DISCHARGE NOTE PROVIDER - CARE PROVIDER_API CALL
Raimundo Tyler  Cardiology  [address] [place] [address] Drive, [address] [place] [id number]  Phone: [phone]  Fax: [phone]  Follow Up Time: 1 week   Raimundo Tyler  Cardiology  [address] [place] [address], [address] [place] [id number]  Phone: [phone]  Fax: [phone]  Follow Up Time: 1 week    Giancarlo Brush  Internal Medicine  [address] [place] [address]  Phone: [phone]  Fax: [phone]  Established Patient  Follow Up Time: 1 week

## 2024-10-23 NOTE — DISCHARGE NOTE NURSING/CASE MANAGEMENT/SOCIAL WORK - FINANCIAL ASSISTANCE
[facility] provides services at a reduced cost to those who are determined to be eligible through [facility]’s financial assistance program. Information regarding [facility]’s financial assistance program can be found by going to [email or web address] or by calling [phone].
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
## 2024-10-23 NOTE — PROGRESS NOTE ADULT - SUBJECTIVE AND OBJECTIVE BOX
INTERVAL EVENTS/SUBJ:  TTE as below    Home Medications:  glimepiride 4 mg oral tablet: 1 tab(s) orally 2 times a day (22 Oct 2024 10:55)  losartan-hydrochlorothiazide 50 mg-12.5 mg oral tablet: 1 tab(s) orally once a day (22 Oct 2024 10:35)  metFORMIN 500 mg oral tablet: 1 tab(s) orally 2 times a day (22 Oct 2024 10:36)  Zetia 10 mg oral tablet: 1 tab(s) orally once a day (22 Oct 2024 10:56)      MEDICATIONS  (STANDING):  aspirin enteric coated 81 milliGRAM(s) Oral daily  colchicine 0.6 milliGRAM(s) Oral two times a day  dextrose 50% Injectable 25 Gram(s) IV Push once  ezetimibe 10 milliGRAM(s) Oral at bedtime  glucagon  Injectable 1 milliGRAM(s) IntraMuscular once  heparin   Injectable 5000 Unit(s) SubCutaneous every 8 hours  influenza  Vaccine (HIGH DOSE) 0.5 milliLiter(s) IntraMuscular once  insulin lispro (ADMELOG) corrective regimen sliding scale   SubCutaneous three times a day before meals  insulin lispro (ADMELOG) corrective regimen sliding scale   SubCutaneous at bedtime  losartan 25 milliGRAM(s) Oral daily  metoprolol succinate ER 25 milliGRAM(s) Oral daily  pantoprazole    Tablet 40 milliGRAM(s) Oral before breakfast    MEDICATIONS  (PRN):      Vital Signs Last 24 Hrs  T(C): 36.5 (23 Oct 2024 04:00), Max: 37.1 (22 Oct 2024 13:00)  T(F): 97.7 (23 Oct 2024 04:00), Max: 98.7 (22 Oct 2024 13:00)  HR: 92 (23 Oct 2024 04:00) (72 - 92)  BP: 119/82 (23 Oct 2024 04:00) (107/79 - 136/81)  BP(mean): 94 (22 Oct 2024 11:18) (94 - 94)  RR: 18 (23 Oct 2024 04:00) (16 - 18)  SpO2: 99% (23 Oct 2024 04:00) (98% - 100%)    Parameters below as of 23 Oct 2024 04:00  Patient On (Oxygen Delivery Method): room air        REVIEW OF SYSTEMS:  As per HPI, otherwise unremarkable.     PHYSICAL EXAM:  Constitutional/Appearance: Normal, Well-developed  HEENT:   Normal oral mucosa, no drainage or redness, supple neck  Lymphatic: No lymphadenopathy  Cardiovascular: Normal S1 S2, No edema, II/VI NEFTALI  Respiratory: Lungs clear to auscultation, respirations non-labored  Psychiatry: A & O x 3, appropriate affect.   Gastrointestinal:  Soft, Non-tender, no distention  Skin: No rashes, No ecchymoses, No cyanosis	  Neurologic: Non-focal, Alert and oriented x 3  Extremities: Normal range of motion  Vascular: Peripheral pulses palpable 2+ bilaterally (radial)    LABS:  CBC Full  -  ( 22 Oct 2024 03:52 )  WBC Count : 6.96 K/uL  RBC Count : 4.43 M/uL  Hemoglobin : 11.7 g/dL  Hematocrit : 37.4 %  Platelet Count - Automated : 219 K/uL  Mean Cell Volume : 84.4 fl  Mean Cell Hemoglobin : 26.4 pg  Mean Cell Hemoglobin Concentration : 31.3 gm/dL  Auto Neutrophil # : 4.61 K/uL  Auto Lymphocyte # : 1.67 K/uL  Auto Monocyte # : 0.42 K/uL  Auto Eosinophil # : 0.21 K/uL  Auto Basophil # : 0.03 K/uL  Auto Neutrophil % : 66.3 %  Auto Lymphocyte % : 24.0 %  Auto Monocyte % : 6.0 %  Auto Eosinophil % : 3.0 %  Auto Basophil % : 0.4 %      10-22    137  |  105  |  34[H]  ----------------------------<  232[H]  4.4   |  19[L]  |  1.70[H]    Ca    9.7      22 Oct 2024 03:52    TPro  7.2  /  Alb  3.9  /  TBili  0.2  /  DBili  x   /  AST  13  /  ALT  15  /  AlkPhos  80  10-22    TTE   1. Hypertrophic obstructive cardiomyopathy (HOCM).   2. Left ventricular cavity is small. Left ventricular systolic function is hyperdynamic with an ejection fraction visually estimated at 75 %. There are no regional wall motion abnormalities seen.   3. Severe left ventricular hypertrophy.   4. Systolic anterior motion of the mitral valve with an increased left ventricular outflow tract gradient of 132 mmHg indicative of severe left ventricular outflow tract obstruction.   5. Mild mitral valve stenosis, secondary to dystrophic mitral annular calcification.      IMPRESSION AND PLAN:  68F w HCM, HTN, DM2, HL here w CP, now resolved. ECG w MORIS but trop flat, CP now resolved.   -tele  -no indication for ischemic testing and CP has resolved, will reassess TTE and likely dc w outpt HCM follow-up  -HCM/Mc[facility]ullen for outpt        35 minutes were spent on this encounter for extensive review of medical record details including labs and/or imaging studies and/or adjacent care team and consultant records, as well as review and reconciliation of current medications. Time was spent on obtaining a history, performing physical examination of patient, and answering patient and/or family questions regarding plan of care. Time was also spent discussing plan of care with patient’s other care team members including primary and consulting teams. Time also was spent on documentation of this encounter into the EHR.    ***    Skip Lau MD, MPhil, [facility]  Cardiologist, [facility]  ; [employer] [facility] [place]  email: [email or web address] and Cardiovascular Surgery on-service contact/call information, go to [email or web address] and use "[employer]" to login.  Outpatient Cardiology appointments, call  [phone] to arrange with a colleague; I do not have outpatient Cardiology clinic.

## 2024-10-23 NOTE — DISCHARGE NOTE PROVIDER - NSDCCAREPROVSEEN_GEN_ALL_CORE_FT
Hemal Lau Sid, Hemal Canaad, Mohsinuzzaman Sid, Hemal Canada, Mohsinuzzaman Park, Ev Sid, Hemal Mcwilliams, Raimundo Camilo

## 2024-10-23 NOTE — PATIENT PROFILE ADULT - FALL HARM RISK - HARM RISK INTERVENTIONS

## 2024-10-23 NOTE — DISCHARGE NOTE PROVIDER - CARE PROVIDERS DIRECT ADDRESSES
,[email or web address]_Unknown

## 2024-10-23 NOTE — CONSULT NOTE ADULT - SUBJECTIVE AND OBJECTIVE BOX
68 year old Female with PMH of HCM, hypertension, Type 2 Diabetes, Hyperlipidemia, obesity initially presented to the ED with complaints of intermittent chest pain. Troponin was negative x3 and a pBNP was elevated to 1004.     10/22/24 she had an TTE which revealed:    1. Hypertrophic obstructive cardiomyopathy (HOCM).   2. Left ventricular cavity is small. Left ventricular systolic function is hyperdynamic with an ejection fraction visually estimated at 75 %. There are no regional wall motion abnormalities seen.   3. Severe left ventricular hypertrophy.   4. Systolic anterior motion of the mitral valve with an increased left ventricular outflow tract gradient of 132 mmHg indicative of severe left ventricular outflow tract obstruction.   5. Mild mitral valve stenosis, secondary to dystrophic mitral annular calcification.  Of note, the intraventricular septum measure at 2.3 cm. This echo was done on Toprol xL 25mg/d and losartan 25mg/d with a HR of 77bpm and systolic BP of 129mmHg.     On interview with the patient:  68 year old Female with PMH of HCM, hypertension, Type 2 Diabetes, Hyperlipidemia, obesity initially presented to the ED with complaints of intermittent chest pain. Troponin was negative x3 and a pBNP was elevated to 1004.     10/22/24 she had an TTE which revealed:    1. Hypertrophic obstructive cardiomyopathy (HOCM).   2. Left ventricular cavity is small. Left ventricular systolic function is hyperdynamic with an ejection fraction visually estimated at 75 %. There are no regional wall motion abnormalities seen.   3. Severe left ventricular hypertrophy.   4. Systolic anterior motion of the mitral valve with an increased left ventricular outflow tract gradient of 132 mmHg indicative of severe left ventricular outflow tract obstruction.   5. Mild mitral valve stenosis, secondary to dystrophic mitral annular calcification.  Of note, the intraventricular septum measure at 2.3 cm. This echo was done on Toprol xL 25mg/d and losartan 25mg/d with a HR of 77bpm and systolic BP of 129mmHg.     On interview with the patient:   She states she has become "lazy" over the years and admits to not exercising. She states she walks about 1-2 block before she starts to feel some shortness of breath. Her only activity is going to the [facility] once a week in which she will try to park as close as possible in order to limit her activity. She states that she is able to walk up 5 stairs in her house before getting some shortness of breath. She also admits to occasional dizziness when going from laying to standing position. She states she frequently has palpitations but had never worn a holter/zio to evaluate.  68 year old Female with PMH of HCM, hypertension, Type 2 Diabetes, Hyperlipidemia, obesity initially presented to the ED with complaints of intermittent chest pain. Troponin was negative x3 and a pBNP was elevated to 1004.     10/22/24 she had an TTE which revealed:    1. Hypertrophic obstructive cardiomyopathy (HOCM).   2. Left ventricular cavity is small. Left ventricular systolic function is hyperdynamic with an ejection fraction visually estimated at 75 %. There are no regional wall motion abnormalities seen.   3. Severe left ventricular hypertrophy.   4. Systolic anterior motion of the mitral valve with an increased left ventricular outflow tract gradient of 132 mmHg indicative of severe left ventricular outflow tract obstruction.   5. Mild mitral valve stenosis, secondary to dystrophic mitral annular calcification.  Of note, the intraventricular septum measure at 2.3 cm. This echo was done on Toprol xL 25mg/d and losartan 25mg/d with a HR of 77bpm and systolic BP of 129mmHg.     On interview with the patient:   She states she has become "lazy" over the years and admits to not exercising. She states she walks about 1-2 block before she starts to feel some shortness of breath. Her only activity is going to the [employer] once a week in which she will try to park as close as possible in order to limit her activity. She states that she is able to walk up 5 stairs in her house before getting some shortness of breath. She also admits to occasional dizziness when going from laying to standing position. She states she frequently has palpitations but had never worn a holter/zio to evaluate.     PHYSICAL EXAM:  Constitutional: Patient laying in bed, NAD  Head: Atraumatic, normocephalic  Respiratory: CTA B/L. No wheezes, rales or rhonchi   Cardiovascular: S1/S2. + systolic murmur best heard at upper sternal boarder with radiation to axilla. No rubs or gallops.  Gastrointestinal: Nondistended, soft, nontender.  Extremities: Moves all extremities. Warm, no edema, nontender  Neurological: A&Ox3. Follows commands. Answering questions appropriately

## 2024-10-23 NOTE — DISCHARGE NOTE NURSING/CASE MANAGEMENT/SOCIAL WORK - PATIENT PORTAL LINK FT
You can access the FollowMyHealth Patient Portal offered by [facility] by registering at the following website: [email or web address]. By joining [facility]’s FollowMyHealth portal, you will also be able to view your health information using other applications (apps) compatible with our system.
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
## 2024-10-23 NOTE — DISCHARGE NOTE PROVIDER - PROVIDER TOKENS
PROVIDER:[TOKEN:[[id number]],FOLLOWUP:[1 week]] PROVIDER:[TOKEN:[[id number]],FOLLOWUP:[1 week]],PROVIDER:[TOKEN:[[id number]],FOLLOWUP:[1 week],ESTABLISHEDPATIENT:[T]]

## 2024-10-23 NOTE — DISCHARGE NOTE PROVIDER - NSDCFUADDAPPT_GEN_ALL_CORE_FT
APPTS ARE READY TO BE MADE: [X] YES    Best Family or Patient Contact (if needed):    Additional Information about above appointments (if needed):    1: PCP  2: Cardiology       Other comments or requests:    APPTS ARE READY TO BE MADE: [X] YES    Best Family or Patient Contact (if needed):    Additional Information about above appointments (if needed):    1: PCP  2: Cardiology       Other comments or requests:     Met with patient face to face and provided the patient with provider referral information, however patient prefers to schedule the appointments on their own.   Gave patient Inpatient Referral Program card if assistance is needed [phone]   APPTS ARE READY TO BE MADE: [x] YES    Best Family or Patient Contact (if needed):    Additional Information about above appointments (if needed):    1: PCP  2: Cardiology       Other comments or requests:     Met with patient face to face and provided the patient with provider referral information, however patient prefers to schedule the appointments on their own.   Gave patient Inpatient Referral Program card if assistance is needed [phone]

## 2024-10-23 NOTE — DISCHARGE NOTE NURSING/CASE MANAGEMENT/SOCIAL WORK - NSDCFUADDAPPT_GEN_ALL_CORE_FT
APPTS ARE READY TO BE MADE: [x] YES    Best Family or Patient Contact (if needed):    Additional Information about above appointments (if needed):    1: PCP  2: Cardiology       Other comments or requests:     Met with patient face to face and provided the patient with provider referral information, however patient prefers to schedule the appointments on their own.   Gave patient Inpatient Referral Program card if assistance is needed [phone]

## 2024-10-23 NOTE — CONSULT NOTE ADULT - ASSESSMENT
Assessment:   68 year old Female with PMH of HCM, hypertension, Type 2 Diabetes, Hyperlipidemia, obesity presenting with CP found with obstructive HCM with SRIKANTH and a LVOT gradient of 138 mmHg.    Plan:  - Presenting symptoms likely all related to severe LVOT gradient as seen on TTE and subsequent Diastolic heart failure. Troponin negative x3 with no wall motion abnormalities on TTE.   - Up-titrate betablocker as tolerated with goal HR 60s   - Discontinue & Avoid vasodilators (currently on Losartan) as this could be worsening her LVOT gradient. If BP control needed, consider addition of Nondihydropyridine CCB  - Follow-up in HCM clinic with Dr. Tyler or myself within 2 weeks of discharge to discuss further treatment options of oHCM.     ** Incomplete Note, recs not finalized until discussion with Dr. Tyler            Assessment:   68 year old Female with PMH of HCM, hypertension, Type 2 Diabetes, Hyperlipidemia, obesity presenting with CP found with obstructive HCM with SRIKANTH and a LVOT gradient of 138 mmHg.    Plan:  - Presenting symptoms likely all related to severe LVOT gradient as seen on TTE and subsequent diastolic heart failure. Troponin negative x3 with no wall motion abnormalities on TTE.   - Up-titrate betablocker as tolerated with goal HR 60s   - Discontinue & Avoid vasodilators (currently on Losartan) as this could be worsening her LVOT gradient. If BP control needed, consider addition of Nondihydropyridine CCB  - Follow-up in HCM clinic with Dr. Tyler or myself within 2 weeks of discharge to discuss further treatment options of oHCM.     ** Incomplete Note, recs not finalized until discussion with Dr. Tyler       Total time of the encounter: __ minutes which included but was not limited to the following: Face-to-face and non-face-to-face time personally spent by the provider preparing to see the patient, obtaining and/or resuming separately obtained history, performing a medically appropriate examination and/or evaluation, counseling and educating the patient/family/caregiver, ordering medications, tests or procedures, referring and communicating with other healthcare professionals, documenting clinical information in the electronic health record, independently interpreting results and communicated results to the patient/family/caregiver and care coordination.       Assessment:   68 year old Female with PMH of HCM, hypertension, Type 2 Diabetes, Hyperlipidemia, obesity presenting with CP found with obstructive HCM with SRIKANTH and a LVOT gradient of 138 mmHg.    Plan:  - Presenting symptoms likely all related to severe LVOT gradient as seen on TTE and subsequent diastolic heart failure. Troponin negative x3 with no wall motion abnormalities on TTE. HCM can cause repolarization abnormalities seen on ECG.   - Up-titrate betablocker as tolerated with goal HR 60s   - Discontinue & Avoid vasodilators (currently on Losartan) as this could be worsening her LVOT gradient. If BP control needed, consider addition of Nondihydropyridine CCB  - Follow-up in HCM clinic with Dr. Tyler or myself within 2 weeks of discharge to discuss further treatment options of oHCM.     ** Incomplete Note, recs not finalized until discussion with Dr. Tyler       Total time of the encounter: __ minutes which included but was not limited to the following: Face-to-face and non-face-to-face time personally spent by the provider preparing to see the patient, obtaining and/or resuming separately obtained history, performing a medically appropriate examination and/or evaluation, counseling and educating the patient/family/caregiver, ordering medications, tests or procedures, referring and communicating with other healthcare professionals, documenting clinical information in the electronic health record, independently interpreting results and communicated results to the patient/family/caregiver and care coordination.       Assessment:   68 year old Female with PMH of HCM, hypertension, Type 2 Diabetes, Hyperlipidemia, obesity presenting with CP found with obstructive HCM with SRIKANTH and a LVOT gradient of 138 mmHg.    Plan:  - Presenting symptoms likely all related to severe LVOT gradient as seen on TTE and subsequent diastolic heart failure. Troponin negative x3 with no wall motion abnormalities on TTE. HCM can cause repolarization abnormalities seen on ECG.  - Up-titrate betablocker as tolerated with goal HR 60s   - Discontinue & Avoid vasodilators (currently on Losartan) as this could be worsening her LVOT gradient. If BP control needed, consider addition of Nondihydropyridine CCB  - Follow-up in HCM clinic with Dr. Tyler or myself within 2 weeks of discharge to discuss further treatment options of oHCM. Spent extensive time with patient discussing pathophysiology of her oHCM and diastolic heart failure. Also briefly discussed mavacamten with patient, which will further be discussed at outpatient clinic.         Total time of the encounter: 80 minutes which included but was not limited to the following: Face-to-face and non-face-to-face time personally spent by the provider preparing to see the patient, obtaining and/or resuming separately obtained history, performing a medically appropriate examination and/or evaluation, counseling and educating the patient/family/caregiver, ordering medications, tests or procedures, referring and communicating with other healthcare professionals, documenting clinical information in the electronic health record, independently interpreting results and communicated results to the patient/family/caregiver and care coordination.

## 2024-10-23 NOTE — DISCHARGE NOTE PROVIDER - NSDCMRMEDTOKEN_GEN_ALL_CORE_FT
glimepiride 4 mg oral tablet: 1 tab(s) orally 2 times a day  losartan-hydrochlorothiazide 50 mg-12.5 mg oral tablet: 1 tab(s) orally once a day  metFORMIN 500 mg oral tablet: 1 tab(s) orally 2 times a day  metoprolol succinate 25 mg oral tablet, extended release: 1 tab(s) orally once a day  Zetia 10 mg oral tablet: 1 tab(s) orally once a day   aspirin 81 mg oral delayed release tablet: 1 tab(s) orally once a day  dilTIAZem 180 mg/24 hours oral tablet, extended release: 1 tab(s) orally once a day  glimepiride 4 mg oral tablet: 1 tab(s) orally 2 times a day  metFORMIN 500 mg oral tablet: 1 tab(s) orally 2 times a day  metoprolol succinate 25 mg oral tablet, extended release: 1 tab(s) orally once a day  pantoprazole 40 mg oral delayed release tablet: 1 tab(s) orally once a day (before a meal)  Zetia 10 mg oral tablet: 1 tab(s) orally once a day

## 2024-10-24 LAB
ANA PAT FLD IF-IMP: ABNORMAL
ANA TITR SER: ABNORMAL
AUTO DIFF PNL BLD: ABNORMAL
C-ANCA SER-ACNC: NEGATIVE — SIGNIFICANT CHANGE UP
P-ANCA SER-ACNC: NEGATIVE — SIGNIFICANT CHANGE UP

## 2024-10-25 LAB — DSDNA AB SER QL CLIF: NEGATIVE — SIGNIFICANT CHANGE UP

## 2024-11-14 NOTE — PROGRESS NOTE ADULT - PROBLEM SELECTOR PROBLEM 4
LOPEZ (acute kidney injury)
DM2 (diabetes mellitus, type 2)
LOPEZ (acute kidney injury)
DM2 (diabetes mellitus, type 2)
DM2 (diabetes mellitus, type 2)
LOPEZ (acute kidney injury)
DM2 (diabetes mellitus, type 2)
LOPEZ (acute kidney injury)
Bilateral upper lid ptosis repair